# Patient Record
Sex: MALE | Race: WHITE | URBAN - METROPOLITAN AREA
[De-identification: names, ages, dates, MRNs, and addresses within clinical notes are randomized per-mention and may not be internally consistent; named-entity substitution may affect disease eponyms.]

---

## 2019-09-24 ENCOUNTER — OFFICE VISIT (OUTPATIENT)
Dept: FAMILY MEDICINE CLINIC | Age: 29
End: 2019-09-24
Payer: COMMERCIAL

## 2019-09-24 VITALS
HEIGHT: 72 IN | RESPIRATION RATE: 20 BRPM | WEIGHT: 224.4 LBS | BODY MASS INDEX: 30.4 KG/M2 | HEART RATE: 80 BPM | DIASTOLIC BLOOD PRESSURE: 80 MMHG | SYSTOLIC BLOOD PRESSURE: 112 MMHG | OXYGEN SATURATION: 97 %

## 2019-09-24 DIAGNOSIS — Z72.52 HIGH RISK HOMOSEXUAL BEHAVIOR: ICD-10-CM

## 2019-09-24 DIAGNOSIS — Z86.711 HISTORY OF PULMONARY EMBOLISM: ICD-10-CM

## 2019-09-24 DIAGNOSIS — Z87.442 HISTORY OF KIDNEY STONES: ICD-10-CM

## 2019-09-24 DIAGNOSIS — G90.A POTS (POSTURAL ORTHOSTATIC TACHYCARDIA SYNDROME): Primary | ICD-10-CM

## 2019-09-24 DIAGNOSIS — H69.83 CHRONIC DYSFUNCTION OF BOTH EUSTACHIAN TUBES: ICD-10-CM

## 2019-09-24 DIAGNOSIS — Z00.00 WELL ADULT EXAM: ICD-10-CM

## 2019-09-24 DIAGNOSIS — Z23 NEED FOR INFLUENZA VACCINATION: ICD-10-CM

## 2019-09-24 DIAGNOSIS — J32.9 RECURRENT SINUSITIS: ICD-10-CM

## 2019-09-24 LAB
ALBUMIN SERPL-MCNC: 4.4 G/DL
ALP BLD-CCNC: 65 U/L
ALT SERPL-CCNC: 52 U/L
AST SERPL-CCNC: 28 U/L
BASOPHILS ABSOLUTE: 0.1 /ΜL
BASOPHILS RELATIVE PERCENT: 1.2 %
BILIRUB SERPL-MCNC: 0.6 MG/DL (ref 0.1–1.4)
BUN BLDV-MCNC: 10 MG/DL
CALCIUM SERPL-MCNC: 8.9 MG/DL
CHLORIDE BLD-SCNC: 103 MMOL/L
CHOLESTEROL, FASTING: 182
CO2: 24 MMOL/L
CREAT SERPL-MCNC: 0.77 MG/DL
EOSINOPHILS ABSOLUTE: 0.2 /ΜL
EOSINOPHILS RELATIVE PERCENT: 4.2 %
GLUCOSE FASTING: 69 MG/DL
HCT VFR BLD CALC: 45.3 % (ref 41–53)
HDLC SERPL-MCNC: 48 MG/DL (ref 35–70)
HEMOGLOBIN: 16 G/DL (ref 13.5–17.5)
HIV AG/AB: NORMAL
LDL CHOLESTEROL CALCULATED: 109 MG/DL (ref 0–160)
LYMPHOCYTES ABSOLUTE: 1.7 /ΜL
LYMPHOCYTES RELATIVE PERCENT: 32.7 %
MCH RBC QN AUTO: 45.3 PG
MCHC RBC AUTO-ENTMCNC: 35.2 G/DL
MCV RBC AUTO: 87 FL
MONOCYTES ABSOLUTE: 0.6 /ΜL
MONOCYTES RELATIVE PERCENT: 11.8 %
NEUTROPHILS ABSOLUTE: 2.6 /ΜL
NEUTROPHILS RELATIVE PERCENT: 50.1 %
PDW BLD-RTO: NORMAL %
PLATELET # BLD: 249 K/ΜL
PMV BLD AUTO: 9.1 FL
POTASSIUM SERPL-SCNC: 4.1 MMOL/L
RBC # BLD: 5.22 10^6/ΜL
SODIUM BLD-SCNC: 139 MMOL/L
TOTAL PROTEIN: 7.4 G/DL (ref 6.4–8.2)
TRIGLYCERIDE, FASTING: 123
TSH SERPL DL<=0.05 MIU/L-ACNC: 1.37 UIU/ML
WBC # BLD: 5.3 10^3/ML

## 2019-09-24 PROCEDURE — 99204 OFFICE O/P NEW MOD 45 MIN: CPT | Performed by: NURSE PRACTITIONER

## 2019-09-24 PROCEDURE — 90686 IIV4 VACC NO PRSV 0.5 ML IM: CPT | Performed by: NURSE PRACTITIONER

## 2019-09-24 PROCEDURE — 90471 IMMUNIZATION ADMIN: CPT | Performed by: NURSE PRACTITIONER

## 2019-09-24 ASSESSMENT — ENCOUNTER SYMPTOMS
CONSTIPATION: 0
EYE ITCHING: 0
NAUSEA: 0
EYE DISCHARGE: 0
SORE THROAT: 0
ABDOMINAL PAIN: 0
COUGH: 0
SHORTNESS OF BREATH: 0
EYE REDNESS: 0
RHINORRHEA: 0
DIARRHEA: 0

## 2019-09-24 ASSESSMENT — PATIENT HEALTH QUESTIONNAIRE - PHQ9
SUM OF ALL RESPONSES TO PHQ QUESTIONS 1-9: 0
1. LITTLE INTEREST OR PLEASURE IN DOING THINGS: 0
2. FEELING DOWN, DEPRESSED OR HOPELESS: 0
SUM OF ALL RESPONSES TO PHQ QUESTIONS 1-9: 0
SUM OF ALL RESPONSES TO PHQ9 QUESTIONS 1 & 2: 0

## 2019-09-24 NOTE — PROGRESS NOTES
Medications   Medication Sig Dispense Refill    Multiple Vitamins-Minerals (MULTIVITAMIN ADULT PO) Take 1 tablet by mouth daily      loratadine (CLARITIN) 10 MG tablet Take 10 mg by mouth daily      vitamin D-3 (CHOLECALCIFEROL) 5000 UNITS TABS Take 5,000 Units by mouth daily       No current facility-administered medications for this visit. Objective:     /80 (Site: Left Upper Arm, Position: Sitting, Cuff Size: Large Adult)   Pulse 80   Resp 20   Ht 5' 11.5\" (1.816 m)   Wt 224 lb 6.4 oz (101.8 kg)   SpO2 97%   BMI 30.86 kg/m²      Physical Exam   Constitutional: He is oriented to person, place, and time. He appears well-developed and well-nourished. He is active. No distress. HENT:   Head: Normocephalic and atraumatic. Right Ear: Tympanic membrane and external ear normal.   Left Ear: Tympanic membrane and external ear normal.   Nose: Nose normal.   Mouth/Throat: Uvula is midline and oropharynx is clear and moist. No oropharyngeal exudate. Eyes: Pupils are equal, round, and reactive to light. Right eye exhibits no discharge. Left eye exhibits no discharge. Cardiovascular: Normal rate, regular rhythm and normal heart sounds. No murmur heard. Pulses:       Radial pulses are 2+ on the right side, and 2+ on the left side. Pulmonary/Chest: Effort normal and breath sounds normal. No respiratory distress. He has no decreased breath sounds. He has no wheezes. Abdominal: Soft. Bowel sounds are normal.   Musculoskeletal:   No visible red or swollen joints to bilateral upper and lower extremities. Neurological: He is alert and oriented to person, place, and time. He has normal strength. Skin: Skin is warm, dry and intact. No rash noted. Psychiatric: He has a normal mood and affect. His speech is normal and behavior is normal.   Vitals reviewed. Assessment:      Diagnosis Orders   1. POTS (postural orthostatic tachycardia syndrome)     2.  Need for influenza vaccination  INFLUENZA, QUADV, 3 YRS AND OLDER, IM PF, PREFILL SYR OR SDV, 0.5ML (AFLURIA QUADV, PF)   3. History of pulmonary embolism     4. History of kidney stones     5. High risk homosexual behavior  HIV Screen    T. pallidum Ab    Herpes Profile    Chlamydia/GC DNA, Urine   6. Well adult exam  CBC Auto Differential    Comprehensive Metabolic Panel, Fasting    Lipid, Fasting    TSH with Reflex   7. Chronic dysfunction of both eustachian tubes  Edgard Ng MD, Otolaryngology, [place]   8. Recurrent sinusitis  Edgard Ng MD, Otolaryngology, [facility]:     Return if symptoms worsen or fail to improve. Care established in office today. Blood work when fasting. Flu today. Chronic allergies. Will request records. Flu today. Encouraged healthy diet and exercise. Call office with any new or worsening symptoms or concerns. Linda Aldrich received counseling on the following healthy behaviors: nutrition, exercise and medication adherence  Reviewed prior labs and health maintenance  Continue current medications, diet and exercise. Discussed use, benefit, and side effects of prescribed medications. Barriers to medication compliance addressed. Patient given educational materials - see patient instructions  Was a self-tracking handout given in paper form or via [employer]? No:     Requested Prescriptions      No prescriptions requested or ordered in this encounter     All patient questions answered. Patient voiced understanding. Quality Measures    Body mass index is 30.86 kg/m². Elevated. Weight control planned discussed Healthy diet and regular exercise. BP: 112/80 Blood pressure is normal. Treatment plan consists of No treatment change needed.     No results found for: LDLCALC, LDLCHOLESTEROL, LDLDIRECT (goal LDL reduction with dx if diabetes is 50% LDL reduction)      PHQ Scores 9/24/2019   PHQ2 Score 0   PHQ9 Score 0     Interpretation of Total Score Depression Severity: 1-4 = Minimal depression, 5-9

## 2019-09-26 DIAGNOSIS — Z00.00 WELL ADULT EXAM: ICD-10-CM

## 2019-09-26 DIAGNOSIS — Z72.52 HIGH RISK HOMOSEXUAL BEHAVIOR: ICD-10-CM

## 2019-09-27 DIAGNOSIS — Z72.52 HIGH RISK HOMOSEXUAL BEHAVIOR: ICD-10-CM

## 2019-10-03 ENCOUNTER — TELEPHONE (OUTPATIENT)
Dept: FAMILY MEDICINE CLINIC | Age: 29
End: 2019-10-03

## 2019-10-05 ENCOUNTER — NURSE ONLY (OUTPATIENT)
Dept: FAMILY MEDICINE CLINIC | Age: 29
End: 2019-10-05
Payer: COMMERCIAL

## 2019-10-05 DIAGNOSIS — Z23 NEED FOR IMMUNIZATION AGAINST TYPHOID: ICD-10-CM

## 2019-10-05 DIAGNOSIS — Z23 NEED FOR HEPATITIS A IMMUNIZATION: Primary | ICD-10-CM

## 2019-10-05 DIAGNOSIS — Z71.84 TRAVEL ADVICE ENCOUNTER: Primary | ICD-10-CM

## 2019-10-05 PROCEDURE — 90471 IMMUNIZATION ADMIN: CPT | Performed by: NURSE PRACTITIONER

## 2019-10-05 PROCEDURE — 90632 HEPA VACCINE ADULT IM: CPT | Performed by: NURSE PRACTITIONER

## 2019-10-23 DIAGNOSIS — J32.9 RECURRENT SINUS INFECTIONS: Primary | ICD-10-CM

## 2019-11-07 ENCOUNTER — TELEPHONE (OUTPATIENT)
Dept: FAMILY MEDICINE CLINIC | Age: 29
End: 2019-11-07

## 2019-11-07 DIAGNOSIS — J30.89 NON-SEASONAL ALLERGIC RHINITIS DUE TO OTHER ALLERGIC TRIGGER: ICD-10-CM

## 2019-11-07 DIAGNOSIS — J32.9 RECURRENT SINUS INFECTIONS: Primary | ICD-10-CM

## 2019-11-07 RX ORDER — DESLORATADINE 5 MG/1
5 TABLET ORAL DAILY
Qty: 30 TABLET | Refills: 5 | Status: SHIPPED | OUTPATIENT
Start: 2019-11-07 | End: 2020-05-05

## 2019-11-07 RX ORDER — PREDNISONE 20 MG/1
TABLET ORAL
Qty: 30 TABLET | Refills: 0 | Status: SHIPPED | OUTPATIENT
Start: 2019-11-07 | End: 2019-11-17

## 2020-01-10 ENCOUNTER — OFFICE VISIT (OUTPATIENT)
Dept: FAMILY MEDICINE CLINIC | Age: 30
End: 2020-01-10
Payer: COMMERCIAL

## 2020-01-10 VITALS
HEART RATE: 76 BPM | DIASTOLIC BLOOD PRESSURE: 98 MMHG | WEIGHT: 226 LBS | SYSTOLIC BLOOD PRESSURE: 126 MMHG | BODY MASS INDEX: 31.08 KG/M2 | TEMPERATURE: 98.7 F

## 2020-01-10 PROBLEM — J32.9 RECURRENT SINUSITIS: Chronic | Status: ACTIVE | Noted: 2020-01-10

## 2020-01-10 PROBLEM — J34.89 CONCHA BULLOSA: Status: ACTIVE | Noted: 2020-01-10

## 2020-01-10 PROBLEM — J34.2 DEVIATED SEPTUM: Status: ACTIVE | Noted: 2020-01-10

## 2020-01-10 PROCEDURE — 99213 OFFICE O/P EST LOW 20 MIN: CPT | Performed by: NURSE PRACTITIONER

## 2020-01-10 RX ORDER — FEXOFENADINE HYDROCHLORIDE AND PSEUDOEPHEDRINE HYDROCHLORIDE 180; 240 MG/1; MG/1
1 TABLET, FILM COATED, EXTENDED RELEASE ORAL DAILY
COMMUNITY
Start: 2019-12-10 | End: 2021-01-10

## 2020-01-10 RX ORDER — MECLIZINE HYDROCHLORIDE 25 MG/1
12.5-25 TABLET ORAL 3 TIMES DAILY PRN
Qty: 60 TABLET | Refills: 0 | Status: SHIPPED | OUTPATIENT
Start: 2020-01-10 | End: 2020-01-30

## 2020-01-10 RX ORDER — AMITRIPTYLINE HYDROCHLORIDE 25 MG/1
25 TABLET, FILM COATED ORAL NIGHTLY
Qty: 30 TABLET | Refills: 5 | Status: SHIPPED | OUTPATIENT
Start: 2020-01-10 | End: 2021-02-09

## 2020-01-10 ASSESSMENT — ENCOUNTER SYMPTOMS
EYE REDNESS: 0
DIARRHEA: 0
ABDOMINAL PAIN: 0
SORE THROAT: 0
RHINORRHEA: 0
EYE DISCHARGE: 0
EYE ITCHING: 0
SHORTNESS OF BREATH: 0
COUGH: 0
CONSTIPATION: 0
NAUSEA: 0

## 2020-01-10 ASSESSMENT — PATIENT HEALTH QUESTIONNAIRE - PHQ9
SUM OF ALL RESPONSES TO PHQ9 QUESTIONS 1 & 2: 0
2. FEELING DOWN, DEPRESSED OR HOPELESS: 0
1. LITTLE INTEREST OR PLEASURE IN DOING THINGS: 0
SUM OF ALL RESPONSES TO PHQ QUESTIONS 1-9: 0
SUM OF ALL RESPONSES TO PHQ QUESTIONS 1-9: 0

## 2020-01-10 NOTE — PROGRESS NOTES
syndrome)    History of pulmonary embolism    History of kidney stones    Chronic dysfunction of both eustachian tubes    Samantha bullosa    Deviated septum    Recurrent sinusitis     HPI    Patient Care Team:  CHAZ Springer CNP as PCP - General (Nurse Practitioner)  CHAZ Springer CNP as PCP - [facility] Empaneled Provider    Visit Information    Have you changed or started any medications since your last visit including any over-the-counter medicines, vitamins, or herbal medicines? yes -    Are you having any side effects from any of your medications? -  no  Have you stopped taking any of your medications? Is so, why? -  no  Have you seen any other physician or provider since your last visit? Yes - Records Obtained  Have you had any other diagnostic tests since your last visit? Yes - Records Obtained  Have you been seen in the emergency room and/or had an admission to a hospital since we last saw you? No  Have you had your routine dental cleaning in the past 6 months? Yes  Have you activated your [employer] account? Yes  If activated, Do you have the mobile des and comfortable using functions? Yes    Reviewed     [x] Past Medical, Family, and Social History was reviewed per writer and does contribute to the patient presenting condition.     [x] Laboratory Results, Vital signs, Imaging, Active Problems, Immunizations, Current/Recently Discontinued Medications, Health Maintenance Activities Due, Referral Notes (if available) were reviewed per writer     [x] Reviewed Depression screening if taken or valid today or any other valid screening tool (others seen below) Interpretation of Total Score DepressionSeverity: 1-4 = Minimal depression, 5-9 = Mild depression, 10-14 = Moderate depression, 15-19 = Moderately severe depression, 20-27 =Severe depression    PHQ Scores 1/10/2020 9/24/2019   PHQ2 Score 0 0   PHQ9 Score 0 0     Interpretation of Total Score Depression Severity: 1-4 = Minimal depression, 5-9 = Mild depression, 10-14 = Moderate depression, 15-19 = Moderately severe depression, 20-27 = Severe depression     Review of Systems (Subjective)     Review of Systems   Constitutional: Negative for activity change, appetite change, fatigue and fever. HENT: Positive for congestion (chronic, improving since sinus surgery). Negative for ear pain, postnasal drip, rhinorrhea and sore throat. Eyes: Positive for visual disturbance. Negative for discharge, redness and itching. Respiratory: Negative for cough and shortness of breath. Cardiovascular: Negative for chest pain. Gastrointestinal: Negative for abdominal pain, constipation, diarrhea and nausea. Genitourinary: Negative for dysuria. Musculoskeletal: Positive for arthralgias and myalgias. Neck and pain   Skin: Negative for rash. Neurological: Positive for dizziness and light-headedness. Negative for headaches. Issues improving since concussion   Psychiatric/Behavioral: Negative for dysphoric mood and sleep disturbance. The patient is not nervous/anxious. Physical Assessment (Objective)     BP (!) 126/98 (Site: Left Upper Arm, Position: Sitting, Cuff Size: Medium Adult)   Pulse 76   Temp 98.7 °F (37.1 °C) (Tympanic)   Wt 226 lb (102.5 kg)   BMI 31.08 kg/m²      Physical Exam  Vitals signs reviewed. Constitutional:       General: He is not in acute distress. Appearance: He is well-developed. HENT:      Head: Normocephalic and atraumatic. Right Ear: Tympanic membrane and external ear normal.      Left Ear: Tympanic membrane and external ear normal.      Nose: Nose normal.      Mouth/Throat:      Pharynx: Uvula midline. No oropharyngeal exudate. Eyes:      General:         Right eye: No discharge. Left eye: No discharge. Pupils: Pupils are equal, round, and reactive to light. Cardiovascular:      Rate and Rhythm: Normal rate and regular rhythm.       Pulses:           Radial pulses are 2+ on the right side and 2+ on the left side. Heart sounds: Normal heart sounds. No murmur. Pulmonary:      Effort: Pulmonary effort is normal. No respiratory distress. Breath sounds: Normal breath sounds. No decreased breath sounds or wheezing. Abdominal:      General: Bowel sounds are normal.      Palpations: Abdomen is soft. Musculoskeletal:      Comments: No visible red or swollen joints to bilateral upper and lower extremities. Skin:     General: Skin is warm and dry. Findings: No rash. Neurological:      Mental Status: He is alert and oriented to person, place, and time. Psychiatric:         Speech: Speech normal.         Behavior: Behavior normal.       Diagnoses:      Diagnosis Orders   1. Post concussion syndrome  amitriptyline (ELAVIL) 25 MG tablet   2. Samantha bullosa     3. Recurrent sinusitis     4. Deviated septum     5. Chronic dysfunction of both eustachian tubes       Plan:     Items for Patient/Writer/Staff to Accomplish   Will review records once received. , Reviewed Recent Records in System from urgent care/EMERGENCY ROOM/Admission/Specialist, Update writer in 1 week either per call or Mychart (preferred). , Medications sent and will be available at pharmacy or mail away, Continue with all other medications as prescribed. , Continue regular follow up's with specialist including but not limited to: Allergy, Mychart already complete or Patient to complete their Mychart Sign up and instructed on easiest way to contact writer. and Patient to bring in medications next time in office    Elavil to potentially improve symptoms   Meclizine for dizziness. Discussed concussion protocol. Verbalized understanding. Encouraged to avoid screen time. Turn down brightness if possible. Regular sleep cycle for next week. Sunglasses in school with florescent lights. Okay to drive. Note provided for school and sports. Report any red flags. No imaging necessary at this time. Reevaluate in 1 week. Encouraged healthy diet and exercise. Call office with any new or worsening symptoms or concerns. Encouraged healthy diet and exercise. Call office with any new or worsening symptoms or concerns. Jw received counseling on the following healthy behaviors: nutrition, exercise and medication adherence  Reviewed prior labs and health maintenance. Continue current medications, diet and exercise. Discussed use, benefit, and side effects of prescribed medications. Barriers to medication compliance addressed. Patient given educational materials - see patient instructions. All patient questions answered. Patient voiced understanding. Return if symptoms worsen or fail to improve. \"There is beauty in all things if you choose to see it\"    Marciano Woods, MSN, APRN-CNP   [facility]  [email or web address]   Office: [phone]   Cell: [phone]    Electronically signed by CHAZ Wadsworth CNP on 1/10/2020 at 3:15 PM

## 2020-02-26 LAB
BILIRUBIN URINE: 0 MG/DL
BLOOD, URINE: POSITIVE
CLARITY: CLEAR
COLOR: YELLOW
GLUCOSE URINE: NEGATIVE
KETONES, URINE: NEGATIVE
LEUKOCYTE ESTERASE, URINE: NEGATIVE
NITRITE, URINE: NEGATIVE
PH UA: 6 (ref 4.5–8)
PROTEIN UA: POSITIVE
SPECIFIC GRAVITY UA: 1.02 (ref 1–1.03)
UROBILINOGEN, URINE: ABNORMAL

## 2020-02-27 DIAGNOSIS — R30.0 DYSURIA: ICD-10-CM

## 2020-02-27 RX ORDER — TAMSULOSIN HYDROCHLORIDE 0.4 MG/1
0.4 CAPSULE ORAL DAILY
Qty: 30 CAPSULE | Refills: 1 | Status: SHIPPED | OUTPATIENT
Start: 2020-02-27 | End: 2021-02-09

## 2020-02-27 RX ORDER — NITROFURANTOIN 25; 75 MG/1; MG/1
100 CAPSULE ORAL 2 TIMES DAILY
Qty: 20 CAPSULE | Refills: 0 | Status: SHIPPED | OUTPATIENT
Start: 2020-02-27 | End: 2020-03-08

## 2020-02-27 RX ORDER — PHENAZOPYRIDINE HYDROCHLORIDE 200 MG/1
200 TABLET, FILM COATED ORAL 3 TIMES DAILY PRN
Qty: 9 TABLET | Refills: 0 | Status: SHIPPED | OUTPATIENT
Start: 2020-02-27 | End: 2020-03-01

## 2020-03-08 ENCOUNTER — TELEPHONE (OUTPATIENT)
Dept: FAMILY MEDICINE CLINIC | Age: 30
End: 2020-03-08

## 2020-03-08 RX ORDER — CIPROFLOXACIN 500 MG/1
500 TABLET, FILM COATED ORAL 2 TIMES DAILY
Qty: 20 TABLET | Refills: 0 | Status: SHIPPED | OUTPATIENT
Start: 2020-03-08 | End: 2020-03-18

## 2020-07-17 ENCOUNTER — TELEPHONE (OUTPATIENT)
Dept: FAMILY MEDICINE CLINIC | Age: 30
End: 2020-07-17

## 2020-07-17 NOTE — TELEPHONE ENCOUNTER
Patient followed with SR. Please call patient to schedule follow-up appointment with any of the new providers to establish care moving forward.

## 2021-02-09 ENCOUNTER — OFFICE VISIT (OUTPATIENT)
Dept: FAMILY MEDICINE CLINIC | Age: 31
End: 2021-02-09
Payer: COMMERCIAL

## 2021-02-09 VITALS
BODY MASS INDEX: 30.91 KG/M2 | TEMPERATURE: 98.4 F | OXYGEN SATURATION: 97 % | WEIGHT: 228.2 LBS | SYSTOLIC BLOOD PRESSURE: 126 MMHG | DIASTOLIC BLOOD PRESSURE: 84 MMHG | HEART RATE: 91 BPM | HEIGHT: 72 IN

## 2021-02-09 DIAGNOSIS — Z76.89 ENCOUNTER TO ESTABLISH CARE: Primary | ICD-10-CM

## 2021-02-09 DIAGNOSIS — Z23 IMMUNIZATION DUE: ICD-10-CM

## 2021-02-09 DIAGNOSIS — Z00.00 ROUTINE PHYSICAL EXAMINATION: ICD-10-CM

## 2021-02-09 PROBLEM — J34.89 CONCHA BULLOSA: Status: RESOLVED | Noted: 2020-01-10 | Resolved: 2021-02-09

## 2021-02-09 PROCEDURE — 90686 IIV4 VACC NO PRSV 0.5 ML IM: CPT | Performed by: NURSE PRACTITIONER

## 2021-02-09 PROCEDURE — 90471 IMMUNIZATION ADMIN: CPT | Performed by: NURSE PRACTITIONER

## 2021-02-09 PROCEDURE — 99385 PREV VISIT NEW AGE 18-39: CPT | Performed by: NURSE PRACTITIONER

## 2021-02-09 RX ORDER — FLUTICASONE PROPIONATE 50 MCG
1 SPRAY, SUSPENSION (ML) NASAL DAILY
COMMUNITY

## 2021-02-09 RX ORDER — MONTELUKAST SODIUM 10 MG/1
10 TABLET ORAL NIGHTLY
COMMUNITY

## 2021-02-09 SDOH — ECONOMIC STABILITY: FOOD INSECURITY: WITHIN THE PAST 12 MONTHS, THE FOOD YOU BOUGHT JUST DIDN'T LAST AND YOU DIDN'T HAVE MONEY TO GET MORE.: NEVER TRUE

## 2021-02-09 ASSESSMENT — PATIENT HEALTH QUESTIONNAIRE - PHQ9
SUM OF ALL RESPONSES TO PHQ9 QUESTIONS 1 & 2: 0
SUM OF ALL RESPONSES TO PHQ QUESTIONS 1-9: 0
SUM OF ALL RESPONSES TO PHQ QUESTIONS 1-9: 0

## 2021-02-09 ASSESSMENT — ENCOUNTER SYMPTOMS
DIARRHEA: 0
WHEEZING: 0
NAUSEA: 0
EYES NEGATIVE: 1
GASTROINTESTINAL NEGATIVE: 1
COUGH: 0
ANAL BLEEDING: 0
RESPIRATORY NEGATIVE: 1
VOMITING: 0
COLOR CHANGE: 0
CHEST TIGHTNESS: 0
BLOOD IN STOOL: 0
SHORTNESS OF BREATH: 0

## 2021-02-09 NOTE — PROGRESS NOTES
[facility]  [address] [place] [address], Leonid Dailey  Dept: [phone]    Flores Oh is a 31 y.o. male who presents today for his medical conditions/complaintsas noted below. Flores Oh is here today c/o Establish Care    Past Medical History:   Diagnosis Date    Chronic sinusitis     Concussion     s/p falling off ladder    Kidney stone     Lemiere syndrome     POTS (postural orthostatic tachycardia syndrome)     Pulmonary emboli (HCC)     States \"17\" PE's      Past Surgical History:   Procedure Laterality Date    LITHOTRIPSY     Guero Vega       Family History   Problem Relation Age of Onset    Heart Disease Maternal Grandfather         triple bypass but a smoker    Other Sister         P.O. T.S    Asthma Brother     Asthma Sister        Social History     Tobacco Use    Smoking status: Never Smoker    Smokeless tobacco: Never Used   Substance Use Topics    Alcohol use: Yes     Comment: Social      Current Outpatient Medications   Medication Sig Dispense Refill    fluticasone (FLONASE) 50 MCG/ACT nasal spray 1 spray by Each Nostril route daily      Loratadine (CLARITIN PO) Take by mouth daily      montelukast (SINGULAIR) 10 MG tablet Take 10 mg by mouth nightly      MAGNESIUM PO Take by mouth      CALCIUM PO Take by mouth daily      Green Tea, Sabine sinensis, (GREEN TEA EXTRACT PO) Take by mouth daily      Multiple Vitamins-Minerals (MULTIVITAMIN ADULT PO) Take 1 tablet by mouth daily      vitamin D-3 (CHOLECALCIFEROL) 5000 UNITS TABS Take 5,000 Units by mouth daily       No current facility-administered medications for this visit.       Allergies   Allergen Reactions    Latex     Bupropion      Other reaction(s): Headache    Sulfa Antibiotics          HPI:     HPI    Presents today c/o new patient     Specialists ENT - Dr. 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 Negative for activity change, appetite change, chills, diaphoresis, fever and unexpected weight change. HENT: Negative. Eyes: Negative. Respiratory: Negative. Negative for cough, chest tightness, shortness of breath and wheezing. Cardiovascular: Negative. Negative for chest pain, palpitations and leg swelling. Gastrointestinal: Negative. Negative for anal bleeding, blood in stool, diarrhea, nausea and vomiting. Endocrine: Negative. Genitourinary: Negative. Negative for difficulty urinating and hematuria. Musculoskeletal: Negative. Skin: Negative. Negative for color change, pallor, rash and wound. Allergic/Immunologic: Positive for environmental allergies. Neurological: Negative. Negative for seizures, syncope, facial asymmetry, speech difficulty, weakness and numbness. Hematological: Negative. Psychiatric/Behavioral: Negative. Negative for dysphoric mood and sleep disturbance. The patient is not nervous/anxious. Objective:     Vitals:    02/09/21 1404   BP: 126/84   Pulse: 91   Temp: 98.4 °F (36.9 °C)   SpO2: 97%       Body mass index is 30.95 kg/m². Physical Exam  Constitutional:       General: He is not in acute distress. Appearance: Normal appearance. He is well-developed and normal weight. He is not ill-appearing, toxic-appearing or diaphoretic.    HENT: Head: Normocephalic and atraumatic. Right Ear: External ear normal. Tympanic membrane is scarred. Left Ear: External ear normal.      Nose: Nose normal.   Eyes:      General: No scleral icterus. Right eye: No discharge. Left eye: No discharge. Conjunctiva/sclera: Conjunctivae normal.      Pupils: Pupils are equal, round, and reactive to light. Neck:      Musculoskeletal: Normal range of motion and neck supple. Trachea: No tracheal deviation. Cardiovascular:      Rate and Rhythm: Normal rate and regular rhythm. Heart sounds: Normal heart sounds. No murmur. No friction rub. No gallop. Pulmonary:      Effort: Pulmonary effort is normal. No tachypnea, accessory muscle usage or respiratory distress. Breath sounds: Normal breath sounds. No stridor. No decreased breath sounds, wheezing, rhonchi or rales. Abdominal:      General: Bowel sounds are normal. There is no distension. Palpations: Abdomen is soft. Tenderness: There is no abdominal tenderness. There is no guarding. Musculoskeletal: Normal range of motion. General: No tenderness or deformity. Right lower leg: No edema. Left lower leg: No edema. Skin:     General: Skin is warm and dry. Coloration: Skin is not pale. Findings: No erythema or rash. Neurological:      Mental Status: He is alert and oriented to person, place, and time. GCS: GCS eye subscore is 4. GCS verbal subscore is 5. GCS motor subscore is 6. Gait: Gait normal.   Psychiatric:         Speech: Speech normal.         Behavior: Behavior normal.         Thought Content: Thought content normal.         Judgment: Judgment normal.           Assessment:         1. Encounter to establish care    2. Routine physical examination    3. Immunization due        Plan:     1. Encounter to establish care      2.  Routine physical examination    Labs up to date and reviewed  Immunizations UTD

## 2021-06-29 ENCOUNTER — OFFICE VISIT (OUTPATIENT)
Dept: FAMILY MEDICINE CLINIC | Age: 31
End: 2021-06-29
Payer: COMMERCIAL

## 2021-06-29 VITALS
BODY MASS INDEX: 30.35 KG/M2 | WEIGHT: 223.8 LBS | HEART RATE: 78 BPM | SYSTOLIC BLOOD PRESSURE: 122 MMHG | TEMPERATURE: 97.6 F | OXYGEN SATURATION: 98 % | DIASTOLIC BLOOD PRESSURE: 100 MMHG

## 2021-06-29 DIAGNOSIS — N50.811 TESTICULAR PAIN, RIGHT: Primary | ICD-10-CM

## 2021-06-29 DIAGNOSIS — R03.0 ELEVATED BLOOD PRESSURE READING: ICD-10-CM

## 2021-06-29 PROCEDURE — 99214 OFFICE O/P EST MOD 30 MIN: CPT | Performed by: NURSE PRACTITIONER

## 2021-06-29 ASSESSMENT — ENCOUNTER SYMPTOMS
NAUSEA: 0
DIARRHEA: 0
VOMITING: 0
RESPIRATORY NEGATIVE: 1
COLOR CHANGE: 0
BACK PAIN: 1
GASTROINTESTINAL NEGATIVE: 1
ALLERGIC/IMMUNOLOGIC NEGATIVE: 1
EYES NEGATIVE: 1
ABDOMINAL PAIN: 0

## 2021-06-29 NOTE — PATIENT INSTRUCTIONS
diet  · Eat 4 to 5 servings of fruit each day. A serving is 1 medium-sized piece of fruit, ½ cup chopped or canned fruit, 1/4 cup dried fruit, or 4 ounces (½ cup) of fruit juice. Choose fruit more often than fruit juice. · Eat 4 to 5 servings of vegetables each day. A serving is 1 cup of lettuce or raw leafy vegetables, ½ cup of chopped or cooked vegetables, or 4 ounces (½ cup) of vegetable juice. Choose vegetables more often than vegetable juice. · Get 2 to 3 servings of low-fat and fat-free dairy each day. A serving is 8 ounces of milk, 1 cup of yogurt, or 1 ½ ounces of cheese. · Eat 6 to 8 servings of grains each day. A serving is 1 slice of bread, 1 ounce of dry cereal, or ½ cup of cooked rice, pasta, or cooked cereal. Try to choose whole-grain products as much as possible. · Limit lean meat, poultry, and fish to 2 servings each day. A serving is 3 ounces, about the size of a deck of cards. · Eat 4 to 5 servings of nuts, seeds, and legumes (cooked dried beans, lentils, and split peas) each week. A serving is 1/3 cup of nuts, 2 tablespoons of seeds, or ½ cup of cooked beans or peas. · Limit fats and oils to 2 to 3 servings each day. A serving is 1 teaspoon of vegetable oil or 2 tablespoons of salad dressing. · Limit sweets and added sugars to 5 servings or less a week. A serving is 1 tablespoon jelly or jam, ½ cup sorbet, or 1 cup of lemonade. · Eat less than 2,300 milligrams (mg) of sodium a day. If you limit your sodium to 1,500 mg a day, you can lower your blood pressure even more. · Be aware that all of these are the suggested number of servings for people who eat 1,800 to 2,000 calories a day. Your recommended number of servings may be different if you need more or fewer calories. Tips for success  · Start small. Do not try to make dramatic changes to your diet all at once. You might feel that you are missing out on your favorite foods and then be more likely to not follow the plan.  Make small changes, and stick with them. Once those changes become habit, add a few more changes. · Try some of the following:  ? Make it a goal to eat a fruit or vegetable at every meal and at snacks. This will make it easy to get the recommended amount of fruits and vegetables each day. ? Try yogurt topped with fruit and nuts for a snack or healthy dessert. ? Add lettuce, tomato, cucumber, and onion to sandwiches. ? Combine a ready-made pizza crust with low-fat mozzarella cheese and lots of vegetable toppings. Try using tomatoes, squash, spinach, broccoli, carrots, cauliflower, and onions. ? Have a variety of cut-up vegetables with a low-fat dip as an appetizer instead of chips and dip. ? Sprinkle sunflower seeds or chopped almonds over salads. Or try adding chopped walnuts or almonds to cooked vegetables. ? Try some vegetarian meals using beans and peas. Add garbanzo or kidney beans to salads. Make burritos and tacos with mashed mensah beans or black beans. Where can you learn more? Go to https://[employer] MaterialspePact.LoggedIn. org and sign in to your [employer] account. Enter R773 in the [facility] box to learn more about \"DASH Diet: Care Instructions. \"     If you do not have an account, please click on the \"Sign Up Now\" link. Current as of: August 31, 2020               Content Version: 12.9  © 2006-2021 [employer]. Care instructions adapted under license by [facility] ([facility]). If you have questions about a medical condition or this instruction, always ask your healthcare professional. [place] [address] any warranty or liability for your use of this information. Patient Education        High Blood Pressure: Care Instructions  Overview     It's normal for blood pressure to go up and down throughout the day. But if it stays up, you have high blood pressure. Another name for high blood pressure is hypertension.   Despite what a lot of people think, high blood pressure usually doesn't cause headaches or make you feel dizzy or lightheaded. It usually has no symptoms. But it does increase your risk of stroke, heart attack, and other problems. You and your doctor will talk about your risks of these problems based on your blood pressure. Your doctor will give you a goal for your blood pressure. Your goal will be based on your health and your age. Lifestyle changes, such as eating healthy and being active, are always important to help lower blood pressure. You might also take medicine to reach your blood pressure goal.  Follow-up care is a key part of your treatment and safety. Be sure to make and go to all appointments, and call your doctor if you are having problems. It's also a good idea to know your test results and keep a list of the medicines you take. How can you care for yourself at home? Medical treatment  · If you stop taking your medicine, your blood pressure will go back up. You may take one or more types of medicine to lower your blood pressure. Be safe with medicines. Take your medicine exactly as prescribed. Call your doctor if you think you are having a problem with your medicine. · Talk to your doctor before you start taking aspirin every day. Aspirin can help certain people lower their risk of a heart attack or stroke. But taking aspirin isn't right for everyone, because it can cause serious bleeding. · See your doctor regularly. You may need to see the doctor more often at first or until your blood pressure comes down. · If you are taking blood pressure medicine, talk to your doctor before you take decongestants or anti-inflammatory medicine, such as ibuprofen. Some of these medicines can raise blood pressure. · Learn how to check your blood pressure at home. Lifestyle changes  · Stay at a healthy weight. This is especially important if you put on weight around the waist. Losing even 10 pounds can help you lower your blood pressure.   · If your doctor recommends it, get more exercise. Walking is a good choice. Bit by bit, increase the amount you walk every day. Try for at least 30 minutes on most days of the week. You also may want to swim, bike, or do other activities. · Avoid or limit alcohol. Talk to your doctor about whether you can drink any alcohol. · Try to limit how much sodium you eat to less than 2,300 milligrams (mg) a day. Your doctor may ask you to try to eat less than 1,500 mg a day. · Eat plenty of fruits (such as bananas and oranges), vegetables, legumes, whole grains, and low-fat dairy products. · Lower the amount of saturated fat in your diet. Saturated fat is found in animal products such as milk, cheese, and meat. Limiting these foods may help you lose weight and also lower your risk for heart disease. · Do not smoke. Smoking increases your risk for heart attack and stroke. If you need help quitting, talk to your doctor about stop-smoking programs and medicines. These can increase your chances of quitting for good. When should you call for help? Call 911  anytime you think you may need emergency care. This may mean having symptoms that suggest that your blood pressure is causing a serious heart or blood vessel problem. Your blood pressure may be over 180/120. For example, call 911 if:    · You have symptoms of a heart attack. These may include:  ? Chest pain or pressure, or a strange feeling in the chest.  ? Sweating. ? Shortness of breath. ? Nausea or vomiting. ? Pain, pressure, or a strange feeling in the back, neck, jaw, or upper belly or in one or both shoulders or arms. ? Lightheadedness or sudden weakness. ? A fast or irregular heartbeat.     · You have symptoms of a stroke. These may include:  ? Sudden numbness, tingling, weakness, or loss of movement in your face, arm, or leg, especially on only one side of your body. ? Sudden vision changes. ? Sudden trouble speaking.   ? Sudden confusion or trouble understanding simple statements. ? Sudden problems with walking or balance. ? A sudden, severe headache that is different from past headaches.     · You have severe back or belly pain. Do not wait until your blood pressure comes down on its own. Get help right away. Call your doctor now or seek immediate care if:    · Your blood pressure is much higher than normal (such as 180/120 or higher), but you don't have symptoms.     · You think high blood pressure is causing symptoms, such as:  ? Severe headache.  ? Blurry vision. Watch closely for changes in your health, and be sure to contact your doctor if:    · Your blood pressure measures higher than your doctor recommends at least 2 times. That means the top number is higher or the bottom number is higher, or both.     · You think you may be having side effects from your blood pressure medicine. Where can you learn more? Go to https://Vilynxclintoneb.Cass Art. org and sign in to your [employer] account. Enter K264 in the [facility] box to learn more about \"High Blood Pressure: Care Instructions. \"     If you do not have an account, please click on the \"Sign Up Now\" link. Current as of: August 31, 2020               Content Version: 12.9  © 5669-1944 [employer]. Care instructions adapted under license by [facility] ([facility]). If you have questions about a medical condition or this instruction, always ask your healthcare professional. [place] [address] any warranty or liability for your use of this information. Patient Education        Learning About High Blood Pressure  What is high blood pressure? Blood pressure is a measure of how hard the blood pushes against the walls of your arteries. It's normal for blood pressure to go up and down throughout the day. But if it stays up, you have high blood pressure. Another name for high blood pressure is hypertension. Two numbers tell you your blood pressure.  The first number is the systolic pressure (top number). It shows how hard the blood pushes when your heart is pumping. The second number is the diastolic pressure (bottom number). It shows how hard the blood pushes between heartbeats, when your heart is relaxed and filling with blood. Your doctor will give you a goal for your blood pressure based on your health and your age. High blood pressure (hypertension) means that the top number stays high, or the bottom number stays high, or both. High blood pressure increases the risk of stroke, heart attack, and other problems. What happens when you have high blood pressure? · Blood flows through your arteries with too much force. Over time, this can damage the heart and the walls of your arteries. But you can't feel it. High blood pressure usually doesn't cause symptoms. · High blood pressure makes your heart work harder. And that can lead to heart failure, which means your heart doesn't pump as much blood as your body needs. · Fat and calcium start to build up in your arteries. This buildup is called hardening of the arteries. It can cause many problems including a heart attack and stroke. · Arteries also carry blood and oxygen to organs like your eyes, kidneys, and brain. If high blood pressure damages those arteries, it can lead to vision loss, kidney disease, stroke, and a higher risk of dementia. How can you prevent high blood pressure? · Stay at a healthy weight. · Try to limit how much sodium you eat to less than 2,300 milligrams (mg) a day. If you limit your sodium to 1,500 mg a day, you can lower your blood pressure even more. ? Buy foods that are labeled \"unsalted,\" \"sodium-free,\" or \"low-sodium. \" Foods labeled \"reduced-sodium\" and \"light sodium\" may still have too much sodium. ? Flavor your food with garlic, lemon juice, onion, vinegar, herbs, and spices instead of salt. Do not use soy sauce, steak sauce, onion salt, garlic salt, mustard, or ketchup on your food. ?  Use less salt (or none) when recipes call for it. You can often use half the salt a recipe calls for without losing flavor. · Be physically active. Get at least 30 minutes of exercise on most days of the week. Walking is a good choice. You also may want to do other activities, such as running, swimming, cycling, or playing tennis or team sports. · Limit alcohol to 2 drinks a day for men and 1 drink a day for women. · Eat plenty of fruits, vegetables, and low-fat dairy products. Eat less saturated and total fats. How is high blood pressure treated? · Your doctor will suggest making lifestyle changes to help your heart. For example, your doctor may ask you to eat healthy foods, quit smoking, lose extra weight, and be more active. · If lifestyle changes don't help enough, your doctor may recommend that you take medicine. · When blood pressure is very high, medicines are needed to lower it. Follow-up care is a key part of your treatment and safety. Be sure to make and go to all appointments, and call your doctor if you are having problems. It's also a good idea to know your test results and keep a list of the medicines you take. Where can you learn more? Go to https://Orthogem.VenueAgent. org and sign in to your [employer] account. Enter P501 in the [facility] box to learn more about \"Learning About High Blood Pressure. \"     If you do not have an account, please click on the \"Sign Up Now\" link. Current as of: August 31, 2020               Content Version: 12.9  © 6670-1704 [employer]. Care instructions adapted under license by [facility] ([facility]). If you have questions about a medical condition or this instruction, always ask your healthcare professional. [place] [address] any warranty or liability for your use of this information.

## 2021-06-29 NOTE — PROGRESS NOTES
[facility]  [address]  Dept: [phone]    Juan Sherwood is a 27 y.o. male who presents today for his medical conditions/complaintsas noted below. Juan Sherwood is here today c/o Mass (testicle right  for the past few weeks)    Past Medical History:   Diagnosis Date    Chronic sinusitis     Concussion     s/p falling off ladder    Kidney stone     Lemiere syndrome     POTS (postural orthostatic tachycardia syndrome)     Pulmonary emboli (HCC)     States \"17\" PE's      Past Surgical History:   Procedure Laterality Date    LITHOTRIPSY     Nathaniel Abarca       Family History   Problem Relation Age of Onset    Heart Disease Maternal Grandfather         triple bypass but a smoker    Other Sister         P.O. T.S    Asthma Brother     Asthma Sister        Social History     Tobacco Use    Smoking status: Never Smoker    Smokeless tobacco: Never Used   Substance Use Topics    Alcohol use: Yes     Comment: Social      Current Outpatient Medications   Medication Sig Dispense Refill    fluticasone (FLONASE) 50 MCG/ACT nasal spray 1 spray by Each Nostril route daily      montelukast (SINGULAIR) 10 MG tablet Take 10 mg by mouth nightly      vitamin D-3 (CHOLECALCIFEROL) 5000 UNITS TABS Take 5,000 Units by mouth daily      Loratadine (CLARITIN PO) Take by mouth daily       No current facility-administered medications for this visit.      Allergies   Allergen Reactions    Latex     Bupropion      Other reaction(s): Headache    Sulfa Antibiotics          HPI:     HPI    Presents today c/o R sided testicular mass   Symptoms ongoing x 10 days   Symptoms are gradually improving   Started like a pimple exterior that felt ' swollen ' on the inside  Pimple seemed to have resolve   Pain was very mild in nature  Declines related penile discharge, urinary symptoms, fever/chills or pain     BP has been running 130-140 / 90's on average   Has checked BP a total of 3-4 x at home   Has used a wrist cuff   Does admit to increased stress   Declines any supplements or stimulants   Non smoker   Doesn't exercise on a regular basis  Weight is decreased by 5 lbs     Health Maintenance:      Subjective:     Review of Systems   Constitutional: Negative. Negative for appetite change, chills, diaphoresis and fever. HENT: Negative. Eyes: Negative. Respiratory: Negative. Cardiovascular: Negative. Gastrointestinal: Negative. Negative for abdominal pain, diarrhea, nausea and vomiting. Endocrine: Negative. Genitourinary: Positive for scrotal swelling (R sided ). Negative for difficulty urinating, discharge, dysuria, flank pain, frequency, genital sores, hematuria, penile pain, penile swelling, testicular pain and urgency. Musculoskeletal: Positive for back pain (chronic unchanged). Skin: Negative. Negative for color change, pallor, rash and wound. Allergic/Immunologic: Negative. Neurological: Negative. Negative for seizures, syncope and facial asymmetry. Hematological: Negative. Psychiatric/Behavioral: Negative. Objective:     Vitals:    06/29/21 0720   BP: (!) 134/100   Pulse: 78   Temp: 97.6 °F (36.4 °C)   SpO2: 98%     Vitals:    06/29/21 0742   BP: (!) 122/100   Pulse:    Temp:    SpO2:        Body mass index is 30.35 kg/m². Physical Exam  Exam conducted with a chaperone present. Constitutional:       General: He is not in acute distress. Appearance: Normal appearance. He is well-developed. He is not ill-appearing, toxic-appearing or diaphoretic. HENT:      Head: Normocephalic and atraumatic. Right Ear: External ear normal.      Left Ear: External ear normal.      Nose: Nose normal.   Eyes:      General: No scleral icterus. Right eye: No discharge. Left eye: No discharge. Conjunctiva/sclera: Conjunctivae normal.      Pupils: Pupils are equal, round, and reactive to light.    Neck:      Trachea: No tracheal deviation. Cardiovascular:      Rate and Rhythm: Normal rate and regular rhythm. Heart sounds: Normal heart sounds. No murmur heard. No friction rub. No gallop. Pulmonary:      Effort: Pulmonary effort is normal. No tachypnea, accessory muscle usage or respiratory distress. Breath sounds: Normal breath sounds. No stridor. No decreased breath sounds, wheezing, rhonchi or rales. Abdominal:      Palpations: Abdomen is soft. Genitourinary:     Testes:         Right: Mass (possible pea sized mass) and tenderness present. Swelling not present. Right testis is descended. Left: Mass, tenderness or swelling not present. Left testis is descended. Epididymis:      Right: Normal. Not inflamed. Left: Normal. Not inflamed. Musculoskeletal:         General: No tenderness or deformity. Normal range of motion. Cervical back: Normal range of motion and neck supple. Skin:     General: Skin is warm and dry. Coloration: Skin is not pale. Findings: No erythema or rash. Neurological:      Mental Status: He is alert and oriented to person, place, and time. GCS: GCS eye subscore is 4. GCS verbal subscore is 5. GCS motor subscore is 6. Gait: Gait is intact. Gait normal.   Psychiatric:         Speech: Speech normal.         Behavior: Behavior normal.         Thought Content: Thought content normal.         Judgment: Judgment normal.           Assessment:         1. Testicular pain, right    2. Elevated blood pressure reading        Plan:     1. Testicular pain, right    - US SCROTUM AND TESTICLES; Future    Complete ultrasound  Follow-up pending results     2.  Elevated blood pressure reading    BP above goal, possibly situational related   Encouraged home BP monitoring for goal < 140/90  Lifestyle modifications discussed  Handouts given  Follow-up in 6 weeks or sooner PRN    Weight reduction - can decrease blood pressure by 5-20 points per 10 kg of weight loss DASH Diet - consume diet rich in fruits, vegetables, and low-fat dairy products with a reduced content of saturated and total fat  Dietary sodium restriction - Reduce dietary sodium intake to no more than 100meq/day (2.4 g sodium)  Physical activity - Engage in regular aerobic physical activity routinely (150min/wk or 30 minutes most days) such as brisk walking, swimming, running, cycling  Moderate consumption of alcohol - limit to more than 2 drinks/day in most men and 1 drink/day in women   Smoking cessation     Credit - UpToDate     Discussed use, benefit, and side effects of prescribed medications. All patient questions answered. Pt voiced understanding. Reviewed health maintenance. Instructed to continue current medications, diet and exercise. Patient agreedwith treatment plan. Follow up as directed.      Electronically signed by CHAZ Hudson CNP on 6/29/2021

## 2021-07-29 DIAGNOSIS — N50.811 TESTICULAR PAIN, RIGHT: ICD-10-CM

## 2021-07-30 DIAGNOSIS — I86.1 BILATERAL VARICOCELES: Primary | ICD-10-CM

## 2021-08-02 ENCOUNTER — OFFICE VISIT (OUTPATIENT)
Dept: FAMILY MEDICINE CLINIC | Age: 31
End: 2021-08-02
Payer: COMMERCIAL

## 2021-08-02 VITALS
HEART RATE: 89 BPM | WEIGHT: 222.6 LBS | DIASTOLIC BLOOD PRESSURE: 92 MMHG | BODY MASS INDEX: 30.19 KG/M2 | OXYGEN SATURATION: 98 % | TEMPERATURE: 97.8 F | SYSTOLIC BLOOD PRESSURE: 144 MMHG

## 2021-08-02 DIAGNOSIS — H60.92 RECURRENT OTITIS EXTERNA OF LEFT EAR: Primary | ICD-10-CM

## 2021-08-02 DIAGNOSIS — R03.0 ELEVATED BLOOD PRESSURE READING: ICD-10-CM

## 2021-08-02 DIAGNOSIS — H66.005 RECURRENT ACUTE SUPPURATIVE OTITIS MEDIA WITHOUT SPONTANEOUS RUPTURE OF LEFT TYMPANIC MEMBRANE: ICD-10-CM

## 2021-08-02 DIAGNOSIS — I86.1 BILATERAL VARICOCELES: ICD-10-CM

## 2021-08-02 PROCEDURE — 99214 OFFICE O/P EST MOD 30 MIN: CPT | Performed by: NURSE PRACTITIONER

## 2021-08-02 RX ORDER — CEFDINIR 300 MG/1
300 CAPSULE ORAL 2 TIMES DAILY
Qty: 20 CAPSULE | Refills: 0 | Status: SHIPPED | OUTPATIENT
Start: 2021-08-02 | End: 2021-08-12

## 2021-08-02 RX ORDER — AZELASTINE 1 MG/ML
SPRAY, METERED NASAL 2 TIMES DAILY
COMMUNITY
Start: 2021-07-01

## 2021-08-02 ASSESSMENT — ENCOUNTER SYMPTOMS
GASTROINTESTINAL NEGATIVE: 1
EYES NEGATIVE: 1
RESPIRATORY NEGATIVE: 1
ABDOMINAL PAIN: 0
SORE THROAT: 0
NAUSEA: 0
RHINORRHEA: 0
FACIAL SWELLING: 0
VOICE CHANGE: 0
ALLERGIC/IMMUNOLOGIC NEGATIVE: 1
DIARRHEA: 0
COUGH: 0
VOMITING: 0
COLOR CHANGE: 0

## 2021-08-02 NOTE — PROGRESS NOTES
[facility]  [address] [place] [address]  Dept: [phone]    Estefania Booth is a 27 y.o. male who presents today for his medical conditions/complaintsas noted below. Estefania Booth is here today c/o Otalgia (left started Thursday)    Past Medical History:   Diagnosis Date    Chronic sinusitis     Concussion     s/p falling off ladder    Kidney stone     Lemiere syndrome     POTS (postural orthostatic tachycardia syndrome)     Pulmonary emboli (HCC)     States \"17\" PE's      Past Surgical History:   Procedure Laterality Date    LITHOTRIPSY     Myriam Older      Dr. Helen Jimenez       Family History   Problem Relation Age of Onset    Heart Disease Maternal Grandfather         triple bypass but a smoker    Other Sister         P.O. T.S    Asthma Brother     Asthma Sister        Social History     Tobacco Use    Smoking status: Never Smoker    Smokeless tobacco: Never Used   Substance Use Topics    Alcohol use: Yes     Comment: Social      Current Outpatient Medications   Medication Sig Dispense Refill    azelastine (ASTELIN) 0.1 % nasal spray 2 times daily      fluticasone (FLONASE) 50 MCG/ACT nasal spray 1 spray by Each Nostril route daily      Loratadine (CLARITIN PO) Take by mouth daily      montelukast (SINGULAIR) 10 MG tablet Take 10 mg by mouth nightly      vitamin D-3 (CHOLECALCIFEROL) 5000 UNITS TABS Take 5,000 Units by mouth daily       No current facility-administered medications for this visit. Allergies   Allergen Reactions    Latex     Bupropion      Other reaction(s): Headache    Sulfa Antibiotics          HPI:     Otalgia   There is pain in the left ear. This is a recurrent problem. The current episode started in the past 7 days (Thursday (5 days) ). The problem occurs constantly. The problem has been gradually worsening. There has been no fever. The pain is at a severity of 10/10. The pain is severe.  Pertinent negatives include no abdominal pain, coughing, diarrhea, ear discharge, headaches, hearing loss, neck pain, rash, rhinorrhea, sore throat or vomiting. He has tried ear drops (Cipro drops July 1-11th - RESOLVED , restarted within the past week, started leftover drops on Friday, started Neomyacin drops on Saturday from [facility]) for the symptoms. The treatment provided no relief. His past medical history is significant for a chronic ear infection. There is no history of hearing loss or a tympanostomy tube. Last ear infection 1 month ago (Amoxil & Cipro drops)   Follows [facility]  Has been suggested that he gets ear tubes   Unsure why he gets recurrent infections    Ultrasound testicles showed bilateral varicocele(s)  Hasn't seen Urologist yet     Health Maintenance:      Subjective:     Review of Systems   Constitutional: Negative. Negative for appetite change, chills, diaphoresis and fever. HENT: Positive for ear pain and tinnitus. Negative for congestion, ear discharge, facial swelling, hearing loss, rhinorrhea, sore throat and voice change. Eyes: Negative. Respiratory: Negative. Negative for cough. Cardiovascular: Negative. Gastrointestinal: Negative. Negative for abdominal pain, diarrhea, nausea and vomiting. Endocrine: Negative. Genitourinary: Negative. Negative for difficulty urinating and dysuria. Musculoskeletal: Negative. Negative for neck pain. Skin: Negative. Negative for color change, pallor, rash and wound. Allergic/Immunologic: Negative. Neurological: Positive for dizziness (mild). Negative for seizures, speech difficulty, weakness and headaches. Hematological: Negative. Psychiatric/Behavioral: The patient is nervous/anxious. Objective:     Vitals:    08/02/21 1615   BP: (!) 150/94   Pulse: 89   Temp: 97.8 °F (36.6 °C)   SpO2: 98%     Vitals:    08/02/21 1630   BP: (!) 144/92   Pulse:    Temp:    SpO2:        Body mass index is 30.19 kg/m².       Physical Exam  Constitutional: General: He is not in acute distress. Appearance: Normal appearance. He is well-developed. He is not ill-appearing, toxic-appearing or diaphoretic. Comments: Appears uncomfortable    HENT:      Head: Normocephalic and atraumatic. Right Ear: External ear normal. There is no impacted cerumen. Tympanic membrane is scarred. Left Ear: External ear normal. Drainage, swelling and tenderness present. There is no impacted cerumen. Tympanic membrane is injected, erythematous and bulging. Nose: Nose normal.   Eyes:      General: No scleral icterus. Right eye: No discharge. Left eye: No discharge. Conjunctiva/sclera: Conjunctivae normal.      Pupils: Pupils are equal, round, and reactive to light. Neck:      Trachea: No tracheal deviation. Cardiovascular:      Rate and Rhythm: Normal rate and regular rhythm. Heart sounds: Normal heart sounds. No murmur heard. No friction rub. No gallop. Pulmonary:      Effort: Pulmonary effort is normal. No tachypnea, accessory muscle usage or respiratory distress. Breath sounds: Normal breath sounds. No stridor. No decreased breath sounds, wheezing, rhonchi or rales. Abdominal:      Palpations: Abdomen is soft. Musculoskeletal:         General: No tenderness or deformity. Normal range of motion. Cervical back: Normal range of motion and neck supple. Skin:     General: Skin is warm and dry. Coloration: Skin is not pale. Findings: No erythema or rash. Neurological:      Mental Status: He is alert and oriented to person, place, and time. GCS: GCS eye subscore is 4. GCS verbal subscore is 5. GCS motor subscore is 6. Gait: Gait is intact. Gait normal.   Psychiatric:         Speech: Speech normal.         Behavior: Behavior normal.         Thought Content: Thought content normal.         Judgment: Judgment normal.           Assessment:         1. Recurrent otitis externa of left ear    2.  Recurrent acute

## 2021-08-26 ENCOUNTER — OFFICE VISIT (OUTPATIENT)
Dept: FAMILY MEDICINE CLINIC | Age: 31
End: 2021-08-26
Payer: COMMERCIAL

## 2021-08-26 VITALS
HEART RATE: 88 BPM | WEIGHT: 225 LBS | TEMPERATURE: 97.6 F | DIASTOLIC BLOOD PRESSURE: 98 MMHG | OXYGEN SATURATION: 100 % | BODY MASS INDEX: 30.52 KG/M2 | SYSTOLIC BLOOD PRESSURE: 132 MMHG

## 2021-08-26 DIAGNOSIS — T78.40XA ALLERGIC REACTION, INITIAL ENCOUNTER: ICD-10-CM

## 2021-08-26 DIAGNOSIS — I10 ESSENTIAL HYPERTENSION: Primary | ICD-10-CM

## 2021-08-26 PROCEDURE — 99214 OFFICE O/P EST MOD 30 MIN: CPT | Performed by: NURSE PRACTITIONER

## 2021-08-26 PROCEDURE — 96372 THER/PROPH/DIAG INJ SC/IM: CPT | Performed by: NURSE PRACTITIONER

## 2021-08-26 RX ORDER — METHYLPREDNISOLONE SODIUM SUCCINATE 125 MG/2ML
125 INJECTION, POWDER, LYOPHILIZED, FOR SOLUTION INTRAMUSCULAR; INTRAVENOUS ONCE
Status: COMPLETED | OUTPATIENT
Start: 2021-08-26 | End: 2021-08-26

## 2021-08-26 RX ORDER — CIPROFLOXACIN AND DEXAMETHASONE 3; 1 MG/ML; MG/ML
SUSPENSION/ DROPS AURICULAR (OTIC) 2 TIMES DAILY
COMMUNITY
Start: 2021-07-01 | End: 2021-09-16 | Stop reason: ALTCHOICE

## 2021-08-26 RX ORDER — DOXYCYCLINE HYCLATE 100 MG
TABLET ORAL 2 TIMES DAILY
COMMUNITY
Start: 2021-08-09 | End: 2021-09-16 | Stop reason: ALTCHOICE

## 2021-08-26 RX ORDER — EPINEPHRINE 0.3 MG/.3ML
0.3 INJECTION SUBCUTANEOUS
Qty: 1 EACH | Refills: 1 | Status: SHIPPED | OUTPATIENT
Start: 2021-08-26 | End: 2021-08-26

## 2021-08-26 RX ORDER — AMLODIPINE BESYLATE 5 MG/1
5 TABLET ORAL DAILY
Qty: 30 TABLET | Refills: 2 | Status: SHIPPED | OUTPATIENT
Start: 2021-08-26 | End: 2021-09-16

## 2021-08-26 RX ADMIN — METHYLPREDNISOLONE SODIUM SUCCINATE 125 MG: 125 INJECTION, POWDER, LYOPHILIZED, FOR SOLUTION INTRAMUSCULAR; INTRAVENOUS at 10:18

## 2021-08-26 ASSESSMENT — ENCOUNTER SYMPTOMS
ALLERGIC/IMMUNOLOGIC NEGATIVE: 1
SORE THROAT: 0
RESPIRATORY NEGATIVE: 1
EYES NEGATIVE: 1
WHEEZING: 0
GASTROINTESTINAL NEGATIVE: 1
CHEST TIGHTNESS: 0
VOMITING: 0
TROUBLE SWALLOWING: 1
NAUSEA: 0
VOICE CHANGE: 0
DIARRHEA: 0
SHORTNESS OF BREATH: 0
RHINORRHEA: 0
STRIDOR: 0
CHOKING: 0
COLOR CHANGE: 0
COUGH: 0

## 2021-08-26 ASSESSMENT — PATIENT HEALTH QUESTIONNAIRE - PHQ9
SUM OF ALL RESPONSES TO PHQ QUESTIONS 1-9: 0
1. LITTLE INTEREST OR PLEASURE IN DOING THINGS: 0
SUM OF ALL RESPONSES TO PHQ QUESTIONS 1-9: 0
SUM OF ALL RESPONSES TO PHQ9 QUESTIONS 1 & 2: 0
2. FEELING DOWN, DEPRESSED OR HOPELESS: 0
SUM OF ALL RESPONSES TO PHQ QUESTIONS 1-9: 0

## 2021-08-26 NOTE — PATIENT INSTRUCTIONS
Weight reduction - can decrease blood pressure by 5-20 points per 10 kg of weight loss   DASH Diet - consume diet rich in fruits, vegetables, and low-fat dairy products with a reduced content of saturated and total fat  Dietary sodium restriction - Reduce dietary sodium intake to no more than 100meq/day (2.4 g sodium)  Physical activity - Engage in regular aerobic physical activity routinely (150min/wk or 30 minutes most days) such as brisk walking, swimming, running, cycling  Moderate consumption of alcohol - limit to more than 2 drinks/day in most men and 1 drink/day in women   Smoking cessation     Credit - UpToDate   Patient Education        High Blood Pressure: Care Instructions  Overview     It's normal for blood pressure to go up and down throughout the day. But if it stays up, you have high blood pressure. Another name for high blood pressure is hypertension. Despite what a lot of people think, high blood pressure usually doesn't cause headaches or make you feel dizzy or lightheaded. It usually has no symptoms. But it does increase your risk of stroke, heart attack, and other problems. You and your doctor will talk about your risks of these problems based on your blood pressure. Your doctor will give you a goal for your blood pressure. Your goal will be based on your health and your age. Lifestyle changes, such as eating healthy and being active, are always important to help lower blood pressure. You might also take medicine to reach your blood pressure goal.  Follow-up care is a key part of your treatment and safety. Be sure to make and go to all appointments, and call your doctor if you are having problems. It's also a good idea to know your test results and keep a list of the medicines you take. How can you care for yourself at home? Medical treatment  · If you stop taking your medicine, your blood pressure will go back up. You may take one or more types of medicine to lower your blood pressure.  Be safe with medicines. Take your medicine exactly as prescribed. Call your doctor if you think you are having a problem with your medicine. · Talk to your doctor before you start taking aspirin every day. Aspirin can help certain people lower their risk of a heart attack or stroke. But taking aspirin isn't right for everyone, because it can cause serious bleeding. · See your doctor regularly. You may need to see the doctor more often at first or until your blood pressure comes down. · If you are taking blood pressure medicine, talk to your doctor before you take decongestants or anti-inflammatory medicine, such as ibuprofen. Some of these medicines can raise blood pressure. · Learn how to check your blood pressure at home. Lifestyle changes  · Stay at a healthy weight. This is especially important if you put on weight around the waist. Losing even 10 pounds can help you lower your blood pressure. · If your doctor recommends it, get more exercise. Walking is a good choice. Bit by bit, increase the amount you walk every day. Try for at least 30 minutes on most days of the week. You also may want to swim, bike, or do other activities. · Avoid or limit alcohol. Talk to your doctor about whether you can drink any alcohol. · Try to limit how much sodium you eat to less than 2,300 milligrams (mg) a day. Your doctor may ask you to try to eat less than 1,500 mg a day. · Eat plenty of fruits (such as bananas and oranges), vegetables, legumes, whole grains, and low-fat dairy products. · Lower the amount of saturated fat in your diet. Saturated fat is found in animal products such as milk, cheese, and meat. Limiting these foods may help you lose weight and also lower your risk for heart disease. · Do not smoke. Smoking increases your risk for heart attack and stroke. If you need help quitting, talk to your doctor about stop-smoking programs and medicines. These can increase your chances of quitting for good.   When should you call for help? Call 911  anytime you think you may need emergency care. This may mean having symptoms that suggest that your blood pressure is causing a serious heart or blood vessel problem. Your blood pressure may be over 180/120. For example, call 911 if:    · You have symptoms of a heart attack. These may include:  ? Chest pain or pressure, or a strange feeling in the chest.  ? Sweating. ? Shortness of breath. ? Nausea or vomiting. ? Pain, pressure, or a strange feeling in the back, neck, jaw, or upper belly or in one or both shoulders or arms. ? Lightheadedness or sudden weakness. ? A fast or irregular heartbeat.     · You have symptoms of a stroke. These may include:  ? Sudden numbness, tingling, weakness, or loss of movement in your face, arm, or leg, especially on only one side of your body. ? Sudden vision changes. ? Sudden trouble speaking. ? Sudden confusion or trouble understanding simple statements. ? Sudden problems with walking or balance. ? A sudden, severe headache that is different from past headaches.     · You have severe back or belly pain. Do not wait until your blood pressure comes down on its own. Get help right away. Call your doctor now or seek immediate care if:    · Your blood pressure is much higher than normal (such as 180/120 or higher), but you don't have symptoms.     · You think high blood pressure is causing symptoms, such as:  ? Severe headache.  ? Blurry vision. Watch closely for changes in your health, and be sure to contact your doctor if:    · Your blood pressure measures higher than your doctor recommends at least 2 times. That means the top number is higher or the bottom number is higher, or both.     · You think you may be having side effects from your blood pressure medicine. Where can you learn more? Go to [email or web address]. org and sign in to your [employer] account.  Enter E186 in the bTendo box to learn more about \"High Blood Pressure: Care Instructions. \"     If you do not have an account, please click on the \"Sign Up Now\" link. Current as of: August 31, 2020               Content Version: 12.9  © 2006-2021 Healthwise, Incorporated. Care instructions adapted under license by [facility] ([facility]). If you have questions about a medical condition or this instruction, always ask your healthcare professional. [address] any warranty or liability for your use of this information.

## 2021-08-26 NOTE — PROGRESS NOTES
[facility]  [address]  Dept: [phone]    Kindra Kelly is a 27 y.o. male who presents today for his medical conditions/complaintsas noted below. Kindra Kelly is here today c/o Blood Pressure Check and Choking (throat feels swollen)    Past Medical History:   Diagnosis Date    Chronic sinusitis     Concussion     s/p falling off ladder    Kidney stone     Lemiere syndrome     POTS (postural orthostatic tachycardia syndrome)     Pulmonary emboli (HCC)     States \"17\" PE's      Past Surgical History:   Procedure Laterality Date    LITHOTRIPSY     Lainey Nevarez       Family History   Problem Relation Age of Onset    Heart Disease Maternal Grandfather         triple bypass but a smoker    Other Sister         P.O. T.S    Asthma Brother     Asthma Sister        Social History     Tobacco Use    Smoking status: Never Smoker    Smokeless tobacco: Never Used   Substance Use Topics    Alcohol use: Yes     Comment: Social      Current Outpatient Medications   Medication Sig Dispense Refill    ciprofloxacin-dexamethasone (CIPRODEX) 0.3-0.1 % otic suspension 2 times daily      doxycycline hyclate (VIBRA-TABS) 100 MG tablet 2 times daily      azelastine (ASTELIN) 0.1 % nasal spray 2 times daily      fluticasone (FLONASE) 50 MCG/ACT nasal spray 1 spray by Each Nostril route daily      Loratadine (CLARITIN PO) Take by mouth daily      montelukast (SINGULAIR) 10 MG tablet Take 10 mg by mouth nightly      vitamin D-3 (CHOLECALCIFEROL) 5000 UNITS TABS Take 5,000 Units by mouth daily       No current facility-administered medications for this visit.      Allergies   Allergen Reactions    Latex     Bupropion      Other reaction(s): Headache    Sulfa Antibiotics          HPI:     HPI    Presents today c/o follow-up HTN  BP running 130 / 's on average at home   Doesn't follow a DASH diet   Weight has been stable   Doesn't exercise on a regular basis   Caffeine intake is 4 shots of espresso per day   Diastolic has been elevated as far back as he can remember     C/o possible allergic reaction  Symptoms started > 1 week ago after starting doxycycline   Symptoms seemed to improve and then restart a few days ago  He will finish abx tomorrow from ENT for MRSA of ear   Admits to sensation of throat irritation & something being stuck in his throat  Cannot relate it to any certain food or other medication(s)   Declines any associated fever/chills, cough, congestion, sore throat, difficulty breathing, stridor, rash, facial, tongue or lip swelling  Hasn't tried anything at home for symptoms      Health Maintenance:      Subjective:     Review of Systems   Constitutional: Negative. Negative for appetite change, chills, diaphoresis and fever. HENT: Positive for trouble swallowing. Negative for congestion, drooling, ear discharge, ear pain, postnasal drip, rhinorrhea, sore throat and voice change. Eyes: Negative. Respiratory: Negative. Negative for cough, choking, chest tightness, shortness of breath, wheezing and stridor. Cardiovascular: Negative. Gastrointestinal: Negative. Negative for diarrhea, nausea and vomiting. Endocrine: Negative. Genitourinary: Negative. Negative for difficulty urinating and enuresis. Musculoskeletal: Negative. Skin: Negative. Negative for color change, pallor, rash and wound. Allergic/Immunologic: Negative. Neurological: Negative. Negative for seizures, syncope and facial asymmetry. Hematological: Negative. Psychiatric/Behavioral: Negative. Objective:     Vitals:    08/26/21 0953   BP: (!) 138/90   Pulse: 88   Temp: 97.6 °F (36.4 °C)   SpO2: 100%     Vitals:    08/26/21 1010   BP: (!) 132/98   Pulse:    Temp:    SpO2:        Body mass index is 30.52 kg/m². Physical Exam  Constitutional:       General: He is not in acute distress. Appearance: Normal appearance.  He is normal.         Behavior: Behavior normal.         Thought Content: Thought content normal.         Judgment: Judgment normal.           Assessment:         1. Essential hypertension    2. Allergic reaction, initial encounter        Plan:     1. Essential hypertension    - amLODIPine (NORVASC) 5 MG tablet; Take 1 tablet by mouth daily  Dispense: 30 tablet; Refill: 2  - CBC Auto Differential; Future  - Comprehensive Metabolic Panel; Future  - TSH with Reflex; Future  - Lipid Panel; Future    BP above goal  START amlodipine, side effects discussed  Update lab work for monitoring purposes  Lifestyle modifications encouraged  F/u 3 weeks  Encouraged home BP monitoring for goal < 140/90     Weight reduction - can decrease blood pressure by 5-20 points per 10 kg of weight loss   DASH Diet - consume diet rich in fruits, vegetables, and low-fat dairy products with a reduced content of saturated and total fat  Dietary sodium restriction - Reduce dietary sodium intake to no more than 100meq/day (2.4 g sodium)  Physical activity - Engage in regular aerobic physical activity routinely (150min/wk or 30 minutes most days) such as brisk walking, swimming, running, cycling  Moderate consumption of alcohol - limit to more than 2 drinks/day in most men and 1 drink/day in women   Smoking cessation     Credit - UpToDate     2. Allergic reaction, initial encounter    - Dale Tracy MD, Allergy & Immunology, [place]  - EPINEPHrine (EPIPEN 2-LACI) 0.3 MG/0.3ML SOAJ injection; Inject 0.3 mLs into the skin once as needed (anaphylaxsis)  Dispense: 1 each; Refill: 1  - methylPREDNISolone sodium (SOLU-MEDROL) injection 125 mg    Recommended OTC antihistamine/benadryl   Allergist referral for further investigation / testing   Monitor for s/s resolution with doxy d/c   Red flag s/s discussed, ER if these develop   Side effects of steroid discussed     Discussed use, benefit, and side effects of prescribed medications. All patient questions answered. Pt voiced understanding. Reviewed health maintenance. Instructed to continue current medications, diet and exercise. Patient agreedwith treatment plan. Follow up as directed.      Electronically signed by CHAZ Watson CNP on 8/26/2021

## 2021-09-16 ENCOUNTER — OFFICE VISIT (OUTPATIENT)
Dept: FAMILY MEDICINE CLINIC | Age: 31
End: 2021-09-16
Payer: COMMERCIAL

## 2021-09-16 VITALS
WEIGHT: 225.8 LBS | DIASTOLIC BLOOD PRESSURE: 95 MMHG | BODY MASS INDEX: 30.62 KG/M2 | SYSTOLIC BLOOD PRESSURE: 132 MMHG | HEART RATE: 76 BPM | OXYGEN SATURATION: 96 %

## 2021-09-16 DIAGNOSIS — I10 ESSENTIAL HYPERTENSION: Primary | ICD-10-CM

## 2021-09-16 PROBLEM — R03.0 ELEVATED BLOOD PRESSURE READING: Status: RESOLVED | Noted: 2021-06-29 | Resolved: 2021-09-16

## 2021-09-16 PROCEDURE — 99214 OFFICE O/P EST MOD 30 MIN: CPT | Performed by: NURSE PRACTITIONER

## 2021-09-16 RX ORDER — AMLODIPINE BESYLATE 10 MG/1
10 TABLET ORAL DAILY
Qty: 30 TABLET | Refills: 2 | Status: SHIPPED | OUTPATIENT
Start: 2021-09-16

## 2021-09-16 ASSESSMENT — ENCOUNTER SYMPTOMS
GASTROINTESTINAL NEGATIVE: 1
RESPIRATORY NEGATIVE: 1
NAUSEA: 0
WHEEZING: 0
VOMITING: 0
COLOR CHANGE: 0
CHEST TIGHTNESS: 0
SHORTNESS OF BREATH: 0
DIARRHEA: 0
ALLERGIC/IMMUNOLOGIC NEGATIVE: 1
COUGH: 0
EYES NEGATIVE: 1

## 2021-09-16 NOTE — PROGRESS NOTES
[facility]  [address]  Dept: [phone]    Crystal Damon is a 27 y.o. male who presents today for his medical conditions/complaintsas noted below. Crystal Damon is here today c/o Hypertension    Past Medical History:   Diagnosis Date    Chronic sinusitis     Concussion     s/p falling off ladder    Kidney stone     Lemiere syndrome     POTS (postural orthostatic tachycardia syndrome)     Pulmonary emboli (HCC)     States \"17\" PE's      Past Surgical History:   Procedure Laterality Date    LITHOTRIPSY     Rafaelajett Quintana       Family History   Problem Relation Age of Onset    Heart Disease Maternal Grandfather         triple bypass but a smoker    Other Sister         P.O. T.S    Asthma Brother     Asthma Sister        Social History     Tobacco Use    Smoking status: Never Smoker    Smokeless tobacco: Never Used   Substance Use Topics    Alcohol use: Yes     Comment: Social      Current Outpatient Medications   Medication Sig Dispense Refill    amLODIPine (NORVASC) 5 MG tablet Take 1 tablet by mouth daily 30 tablet 2    azelastine (ASTELIN) 0.1 % nasal spray 2 times daily      fluticasone (FLONASE) 50 MCG/ACT nasal spray 1 spray by Each Nostril route daily      Loratadine (CLARITIN PO) Take by mouth daily      montelukast (SINGULAIR) 10 MG tablet Take 10 mg by mouth nightly      EPINEPHrine (EPIPEN 2-LACI) 0.3 MG/0.3ML SOAJ injection Inject 0.3 mLs into the skin once as needed (anaphylaxsis) 1 each 1     No current facility-administered medications for this visit.      Allergies   Allergen Reactions    Latex     Bupropion      Other reaction(s): Headache    Sulfa Antibiotics          HPI:     HPI    Presents today c/o follow-up HTN  Started amlodipine last visit  Tolerating medication without side effects   Weight is stable  BP running 120 / 80's on average at home , higher in the morning   Recently ordered a treadmill , plans to start exercising   Diet is normal , doesn't follow DASH diet   Caffeine intake is 4 shots of espresso per day    Health Maintenance:      Subjective:     Review of Systems   Constitutional: Negative. Negative for appetite change, chills, diaphoresis, fatigue, fever and unexpected weight change. HENT: Negative. Eyes: Negative. Respiratory: Negative. Negative for cough, chest tightness, shortness of breath and wheezing. Cardiovascular: Negative. Negative for chest pain and leg swelling. Gastrointestinal: Negative. Negative for diarrhea, nausea and vomiting. Endocrine: Negative. Genitourinary: Negative. Negative for difficulty urinating and dysuria. Musculoskeletal: Negative. Skin: Negative. Negative for color change, pallor, rash and wound. Allergic/Immunologic: Negative. Neurological: Negative. Negative for dizziness, seizures, syncope, light-headedness and headaches. Hematological: Negative. Psychiatric/Behavioral: Negative. Negative for dysphoric mood. The patient is not nervous/anxious. Objective:       Vitals:    09/16/21 0844 09/16/21 0857 09/16/21 0858   BP: (!) 120/90 (!) 136/100 (!) 132/95   Site: Left Upper Arm     Position: Sitting     Cuff Size: Small Adult     Pulse: 76     SpO2: 96%     Weight: 225 lb 12.8 oz (102.4 kg)         Body mass index is 30.62 kg/m². Physical Exam  Constitutional:       General: He is not in acute distress. Appearance: Normal appearance. He is well-developed. He is not ill-appearing, toxic-appearing or diaphoretic. HENT:      Head: Normocephalic and atraumatic. Right Ear: Tympanic membrane, ear canal and external ear normal.      Left Ear: Tympanic membrane, ear canal and external ear normal.      Nose: Nose normal.   Eyes:      General: No scleral icterus. Right eye: No discharge. Left eye: No discharge.       Conjunctiva/sclera: Conjunctivae normal.      Pupils: Pupils are equal, round, and swimming, running, cycling  Moderate consumption of alcohol - limit to more than 2 drinks/day in most men and 1 drink/day in women   Smoking cessation     Credit - UpToDate     Discussed use, benefit, and side effects of prescribed medications. All patient questions answered. Pt voiced understanding. Reviewed health maintenance. Instructed to continue current medications, diet and exercise. Patient agreedwith treatment plan. Follow up as directed.      Electronically signed by CHAZ Watson CNP on 9/16/2021

## 2021-12-06 ENCOUNTER — NURSE TRIAGE (OUTPATIENT)
Dept: OTHER | Facility: CLINIC | Age: 31
End: 2021-12-06

## 2021-12-06 NOTE — TELEPHONE ENCOUNTER
Reason for Disposition   Continuous (nonstop) coughing interferes with work or school and no improvement using cough treatment per Care Advice    Answer Assessment - Initial Assessment Questions  1. ONSET: \"When did the cough begin? \"       Two weeks ago- tested negative for flu and Covid    2. SEVERITY: \"How bad is the cough today? \"       Cough is keeping patient up at night. 3. RESPIRATORY DISTRESS: \"Describe your breathing. \"       SOB with coughing and \"a little SOB\" with movement- receives allergy shots and is asthmatic    4. FEVER: \"Do you have a fever? \" If so, ask: \"What is your temperature, how was it measured, and when did it start? \"      Denies- Bactrim took way fever from infection in leg    5. HEMOPTYSIS: \"Are you coughing up any blood? \" If so ask: \"How much? \" (flecks, streaks, tablespoons, etc.)     Denies    6. TREATMENT: \"What have you done so far to treat the cough? \" (e.g., meds, fluids, humidifier)      Drinking hot water, sinus rinses (3X a week)    7. CARDIAC HISTORY: \"Do you have any history of heart disease? \" (e.g., heart attack, congestive heart failure)       HTN    8. LUNG HISTORY: \"Do you have any history of lung disease? \"  (e.g., pulmonary embolus, asthma, emphysema)      \"boderline\" asthmatic- no rescue inhaler, has been on Advair for URI's    9. PE RISK FACTORS: \"Do you have a history of blood clots? 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 OTHER SYMPTOMS: \"Do you have any other symptoms? (e.g., runny nose, wheezing, chest pain)       Taking Keflex for ingrown hair on groin- saw [facility] two weeks ago. Was seen at Urgent care one week ago- put on Bactrim    11. PREGNANCY: \"Is there any chance you are pregnant? \" \"When was your last menstrual period? \"        N/A    12. TRAVEL: \"Have you traveled out of the country in the last month? \" (e.g., travel history, exposures)        N/A    Protocols used: COUGH-ADULT-OH    Received call from Renetta at [facility] with [employer]. Brief description of triage: See above. Triage indicates for patient to be seen today or tomorrow. Care advice provided, patient verbalizes understanding; denies any other questions or concerns; instructed to call back for any new or worsening symptoms. Writer provided warm transfer to Marcus Jeter at [facility] for appointment scheduling. Attention Provider: Thank you for allowing me to participate in the care of your patient. The patient was connected to triage in response to information provided to the ECC/PSC. Please do not respond through this encounter as the response is not directed to a shared pool.

## 2022-08-17 ENCOUNTER — OFFICE VISIT (OUTPATIENT)
Dept: INFECTIOUS DISEASES | Age: 32
End: 2022-08-17
Payer: COMMERCIAL

## 2022-08-17 VITALS
HEART RATE: 115 BPM | TEMPERATURE: 99.9 F | HEIGHT: 72 IN | DIASTOLIC BLOOD PRESSURE: 86 MMHG | WEIGHT: 235.8 LBS | SYSTOLIC BLOOD PRESSURE: 139 MMHG | BODY MASS INDEX: 31.94 KG/M2

## 2022-08-17 DIAGNOSIS — L02.92 BOILS OF MULTIPLE SITES: Primary | ICD-10-CM

## 2022-08-17 DIAGNOSIS — K59.1 FUNCTIONAL DIARRHEA: ICD-10-CM

## 2022-08-17 DIAGNOSIS — R21 SKIN RASH: ICD-10-CM

## 2022-08-17 DIAGNOSIS — H60.331 CHRONIC SWIMMER'S EAR OF RIGHT SIDE: ICD-10-CM

## 2022-08-17 PROCEDURE — 99204 OFFICE O/P NEW MOD 45 MIN: CPT | Performed by: INTERNAL MEDICINE

## 2022-08-17 RX ORDER — FLUTICASONE PROPIONATE AND SALMETEROL 250; 50 UG/1; UG/1
1 POWDER RESPIRATORY (INHALATION) EVERY 12 HOURS
COMMUNITY

## 2022-08-17 RX ORDER — CIPROFLOXACIN AND DEXAMETHASONE 3; 1 MG/ML; MG/ML
4 SUSPENSION/ DROPS AURICULAR (OTIC) 2 TIMES DAILY
Qty: 1 EACH | Refills: 2 | Status: SHIPPED | OUTPATIENT
Start: 2022-08-17 | End: 2022-08-24

## 2022-08-17 RX ORDER — CIPROFLOXACIN/HYDROCORTISONE 0.2 %-1 %
4 SUSPENSION, DROPS(FINAL DOSAGE FORM)(ML) OTIC (EAR) 2 TIMES DAILY
Qty: 1 EACH | Refills: 2 | Status: SHIPPED | OUTPATIENT
Start: 2022-08-17 | End: 2022-08-24

## 2022-08-17 ASSESSMENT — ENCOUNTER SYMPTOMS
ABDOMINAL DISTENTION: 0
EYE DISCHARGE: 0
COLOR CHANGE: 0
APNEA: 0

## 2022-08-17 NOTE — PATIENT INSTRUCTIONS
8/17/22mm: Pt will get labs done at [facility] lab. Pt was provided with lab orders, culture swab, supplies to obtain stool sample. Referral and contact information, [facility] ph: [phone] was provided to the pt at checkout. Referral was faxed to: [phone].

## 2022-08-17 NOTE — PROGRESS NOTES
[facility] - Initial Consult Note  Today's Date: 8/17/2022    Impression :   Recurrent boils  Past MRSDA hx   3 y monogamous - sex w man  Skin chronic rash - neoplastic ? Post AB persistent diarrhea  Swimmer s ear R > L recurrent    Sinus sx 2020 -   Past Lemierre 2009 and treated and recovered  Gets allergy shorts weekly, allergic to almost everything environemtal  Asthma  Covid in June 2022  Recommendations      Diagnosis Orders   1. Boils of multiple sites  Culture, MRSA, Screening    Clostridium Difficile Toxin/Antigen    Gastrointestinal Panel, Molecular    Culture, Aerobic    HIV Screen    T. Pallidum Ab    Chlamydia/GC DNA, Urine    mupirocin (BACTROBAN NASAL) 2 % nasal ointment    CBC with Auto Differential    Sedimentation Rate      2. Functional diarrhea  HIV Screen    T. Pallidum Ab    Chlamydia/GC DNA, Urine    mupirocin (BACTROBAN NASAL) 2 % nasal ointment      3. Skin rash  External Referral To Dermatology    Chlamydia/GC DNA, Urine      4. Chronic swimmer's ear of right side  ciprofloxacin-hydrocortisone (CIPRO HC) 0.2-1 % otic suspension          Return in about 2 months (around 10/17/2022).       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 Swimmer s ear R > L recurrent and now present    Plan;  Test the stools for C dff  and stool panel - keep probiotic  Nasal swab for MRSA and SA  Decontaminate - disc w pt decontamination   monogamous sex w man, get HIV and VDRL  2 small wart like lesions on R arm, ask derm to eval for possible basal cell  Cipro otic x 2 weeks      I have personally reviewed the past medical history, past surgical history, medications, social history, and family history, and I haveupdated the database accordingly. Past Medical History:     Past Medical History:   Diagnosis Date    Chronic sinusitis     Concussion     s/p falling off ladder    Kidney stone     Lemiere syndrome     POTS (postural orthostatic tachycardia syndrome)     Pulmonary emboli (HCC)     States \"17\" PE's       Past Surgical  History:     Past Surgical History:   Procedure Laterality Date    LITHOTRIPSY      SINUS SURGERY      Dr. Cordelia March       Medications:     Current Outpatient Medications:     fluticasone-salmeterol (ADVAIR DISKUS) 250-50 MCG/ACT AEPB diskus inhaler, Inhale 1 puff into the lungs every 12 hours, Disp: , Rfl:     mupirocin (BACTROBAN NASAL) 2 % nasal ointment, by Nasal route 2 times daily for 14 days Take by Nasal route 2 times daily. , Disp: 2 each, Rfl: 2    ciprofloxacin-hydrocortisone (CIPRO HC) 0.2-1 % otic suspension, Place 4 drops in ear(s) 2 times daily for 7 days, Disp: 1 each, Rfl: 2    ciprofloxacin-dexamethasone (CIPRODEX) 0.3-0.1 % otic suspension, Place 4 drops into both ears 2 times daily for 7 days, Disp: 1 each, Rfl: 2    amLODIPine (NORVASC) 10 MG tablet, Take 1 tablet by mouth daily, Disp: 30 tablet, Rfl: 2    azelastine (ASTELIN) 0.1 % nasal spray, 2 times daily, Disp: , Rfl:     fluticasone (FLONASE) 50 MCG/ACT nasal spray, 1 spray by Each Nostril route daily, Disp: , Rfl:     Loratadine (CLARITIN PO), Take by mouth daily, Disp: , Rfl:     montelukast (SINGULAIR) 10 MG tablet, Take 10 mg by mouth nightly, Disp: , Rfl:     EPINEPHrine (EPIPEN 2-LACI) 0.3 MG/0.3ML SOAJ injection, Inject 0.3 mLs into the skin once as needed (anaphylaxsis), Disp: 1 each, Rfl: 1      Social History:     Social History     Socioeconomic History    Marital status:      Spouse name: Not on file    Number of children: Not on file    Years of education: Not on file    Highest education level: Not on file   Occupational History    Not on file   Tobacco Use    Smoking status: Never    Smokeless tobacco: Never   Vaping Use    Vaping Use: Never used   Substance and Sexual Activity    Alcohol use: Yes     Comment: Social    Drug use: Yes     Types: Marijuana [place])     Comment: occasional    Sexual activity: Yes     Partners: Male     Comment: , Closed relationship. Last 6 month number 5,  100 or so life no   Other Topics Concern    Not on file   Social History Narrative    Not on file     Social Determinants of Health     Financial Resource Strain: Not on file   Food Insecurity: Not on file   Transportation Needs: Not on file   Physical Activity: Not on file   Stress: Not on file   Social Connections: Not on file   Intimate Partner Violence: Not on file   Housing Stability: Not on file       Family History:     Family History   Problem Relation Age of Onset    Heart Disease Maternal Grandfather         triple bypass but a smoker    Other Sister         P.O. T.S    Asthma Brother     Asthma Sister         Allergies:   Latex, Bupropion, and Sulfa antibiotics     Review of Systems:   Review of Systems   Constitutional:  Negative for activity change. HENT:  Positive for ear pain (R). Negative for congestion. Eyes:  Negative for discharge. Respiratory:  Negative for apnea. Cardiovascular:  Negative for chest pain. Gastrointestinal:  Negative for abdominal distention. Endocrine: Negative for heat intolerance. Genitourinary:  Negative for dysuria. Musculoskeletal:  Negative for arthralgias.    Skin:  Negative for color change. Allergic/Immunologic: Negative for immunocompromised state. Neurological:  Negative for dizziness. Hematological:  Negative for adenopathy. Psychiatric/Behavioral:  Negative for agitation. Physical Examination :   Blood pressure 139/86, pulse (!) 115, temperature 99.9 °F (37.7 °C), height 6' (1.829 m), weight 235 lb 12.8 oz (107 kg). Physical Exam  Constitutional:       Appearance: Normal appearance. HENT:      Head: Normocephalic and atraumatic. Ears:      Comments: R ear red canal. Swimmer s ear     Nose: Nose normal.      Mouth/Throat:      Mouth: Mucous membranes are moist.   Eyes:      General:         Left eye: No discharge. Conjunctiva/sclera: Conjunctivae normal.   Cardiovascular:      Rate and Rhythm: Normal rate and regular rhythm. Heart sounds: Normal heart sounds. No murmur heard. Pulmonary:      Effort: No respiratory distress. Breath sounds: Normal breath sounds. Abdominal:      General: There is no distension. Palpations: Abdomen is soft. Tenderness: There is no abdominal tenderness. Genitourinary:     Comments: No umaña  Musculoskeletal:         General: No swelling or deformity. Cervical back: Neck supple. No rigidity. Skin:     General: Skin is dry. Coloration: Skin is not jaundiced. Neurological:      General: No focal deficit present. Mental Status: He is alert. Mental status is at baseline. Cranial Nerves: No cranial nerve deficit. Psychiatric:         Mood and Affect: Mood normal.         Thought Content:  Thought content normal.         Medical Decision Making:   I have independently reviewed/ordered the following labs:    CBCwith Differential:   Lab Results   Component Value Date/Time    WBC 5.3 09/24/2019 12:00 AM    WBC 4.7 05/11/2015 12:08 PM    HGB 16.0 09/24/2019 12:00 AM    HGB 15.5 05/11/2015 12:08 PM    HCT 45.3 09/24/2019 12:00 AM    HCT 47.4 05/11/2015 12:08 PM     09/24/2019 12:00 AM  05/11/2015 12:08 PM    LYMPHOPCT 32.7 09/24/2019 12:00 AM    LYMPHOPCT 24 05/11/2015 12:08 PM    LYMPHOPCT 7 11/05/2013 10:01 AM    MONOPCT 11.8 09/24/2019 12:00 AM    MONOPCT 19 05/11/2015 12:08 PM    MONOPCT 6 11/05/2013 10:01 AM    EOSPCT 4.2 09/24/2019 12:00 AM     BMP:  Lab Results   Component Value Date/Time     09/24/2019 12:00 AM     05/11/2015 12:08 PM    K 4.1 09/24/2019 12:00 AM    K 3.9 05/11/2015 12:08 PM     09/24/2019 12:00 AM    CL 99 05/11/2015 12:08 PM    CO2 24 09/24/2019 12:00 AM    CO2 27 05/11/2015 12:08 PM    BUN 10 09/24/2019 12:00 AM    BUN 10 05/11/2015 12:08 PM    CREATININE 0.77 09/24/2019 12:00 AM    CREATININE 0.78 05/11/2015 12:08 PM     Hepatic Function Panel:   Lab Results   Component Value Date/Time    PROT 7.4 09/24/2019 12:00 AM    LABALBU 4.4 09/24/2019 12:00 AM    BILITOT 0.6 09/24/2019 12:00 AM    ALKPHOS 65 09/24/2019 12:00 AM    ALT 52 09/24/2019 12:00 AM    AST 28 09/24/2019 12:00 AM     No results found for: RPR  No results found for: HIV  No results found for: [facility]  Lab Results   Component Value Date/Time    MUCUS NOT REPORTED 11/06/2013 07:52 AM    RBC 5.22 09/24/2019 12:00 AM    TRICHOMONAS NOT REPORTED 11/06/2013 07:52 AM    WBC 5.3 09/24/2019 12:00 AM    YEAST NOT REPORTED 11/06/2013 07:52 AM    TURBIDITY CLOUDY 11/05/2013 09:28 AM     Lab Results   Component Value Date/Time    CREATININE 0.77 09/24/2019 12:00 AM    GLUCOSE 94 05/11/2015 12:08 PM   you for allowing us to participate in the care of this patient. Please call with questions. Renate Conway MD  - Office: [phone]    Please note that this chart was generated using voice recognition Dragon dictation software. Although every effort was made to ensure the accuracy of this automated transcription, some errors in transcription mayhave occurred.

## 2022-08-18 ENCOUNTER — TELEPHONE (OUTPATIENT)
Dept: INFECTIOUS DISEASES | Age: 32
End: 2022-08-18

## 2022-08-18 LAB
BASOPHILS ABSOLUTE: NORMAL
BASOPHILS RELATIVE PERCENT: NORMAL
EOSINOPHILS ABSOLUTE: NORMAL
EOSINOPHILS RELATIVE PERCENT: NORMAL
HCT VFR BLD CALC: NORMAL %
HEMOGLOBIN: NORMAL
HIV AG/AB: NORMAL
LYMPHOCYTES ABSOLUTE: NORMAL
LYMPHOCYTES RELATIVE PERCENT: NORMAL
MCH RBC QN AUTO: NORMAL PG
MCHC RBC AUTO-ENTMCNC: NORMAL G/DL
MCV RBC AUTO: NORMAL FL
MONOCYTES ABSOLUTE: NORMAL
MONOCYTES RELATIVE PERCENT: NORMAL
NEUTROPHILS ABSOLUTE: NORMAL
NEUTROPHILS RELATIVE PERCENT: NORMAL
PDW BLD-RTO: NORMAL %
PLATELET # BLD: NORMAL 10*3/UL
PMV BLD AUTO: NORMAL FL
RBC # BLD: NORMAL 10*6/UL
SEDIMENTATION RATE, ERYTHROCYTE: NORMAL
WBC # BLD: NORMAL 10*3/UL

## 2022-08-18 RX ORDER — VANCOMYCIN HYDROCHLORIDE 250 MG/1
250 CAPSULE ORAL 4 TIMES DAILY
Qty: 40 CAPSULE | Refills: 0 | Status: SHIPPED | OUTPATIENT
Start: 2022-08-18 | End: 2022-09-01

## 2022-08-18 NOTE — TELEPHONE ENCOUNTER
Les Ramírez from pharmacy called. Bactroban nasal ointment has been discontinued, Les Ramírez is requesting the rx be changed to bactroban ointment and pt can apply with a q tip. Please advise if you agree.  Thank you

## 2022-08-19 ENCOUNTER — TELEPHONE (OUTPATIENT)
Dept: INFECTIOUS DISEASES | Age: 32
End: 2022-08-19

## 2022-08-23 DIAGNOSIS — K59.1 FUNCTIONAL DIARRHEA: ICD-10-CM

## 2022-08-23 DIAGNOSIS — L02.92 BOILS OF MULTIPLE SITES: ICD-10-CM

## 2022-08-23 DIAGNOSIS — B95.8 STAPH INFECTION: Primary | ICD-10-CM

## 2022-08-23 DIAGNOSIS — R21 SKIN RASH: ICD-10-CM

## 2022-08-23 RX ORDER — MUPIROCIN CALCIUM 20 MG/G
CREAM TOPICAL
Qty: 1 EACH | Refills: 2 | Status: SHIPPED | OUTPATIENT
Start: 2022-08-23

## 2022-10-17 ENCOUNTER — OFFICE VISIT (OUTPATIENT)
Dept: INFECTIOUS DISEASES | Age: 32
End: 2022-10-17
Payer: COMMERCIAL

## 2022-10-17 VITALS
DIASTOLIC BLOOD PRESSURE: 101 MMHG | SYSTOLIC BLOOD PRESSURE: 149 MMHG | RESPIRATION RATE: 16 BRPM | HEART RATE: 85 BPM | OXYGEN SATURATION: 97 % | WEIGHT: 224 LBS | BODY MASS INDEX: 30.34 KG/M2 | HEIGHT: 72 IN

## 2022-10-17 DIAGNOSIS — H60.333 ACUTE SWIMMER'S EAR OF BOTH SIDES: ICD-10-CM

## 2022-10-17 DIAGNOSIS — Z87.2 HX OF FOLLICULITIS: Primary | ICD-10-CM

## 2022-10-17 PROCEDURE — 99214 OFFICE O/P EST MOD 30 MIN: CPT | Performed by: INTERNAL MEDICINE

## 2022-10-17 ASSESSMENT — ENCOUNTER SYMPTOMS
EYE PAIN: 0
ABDOMINAL DISTENTION: 0
APNEA: 0
COLOR CHANGE: 0
EYE REDNESS: 0
EYE DISCHARGE: 0

## 2022-10-17 NOTE — PROGRESS NOTES
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 Culture of the portal if it recurs  His boil seem to recur in the May to July season over the last 2 years, probably the sweat season. Hence personal decontamination might not show results until then  Nasal Bactroban twice a day for 2 weeks, has not used it this way yet, I reemphasized it. See me. Diagnosis Orders   1. Hx of folliculitis        2. Acute swimmer's ear of both sides            Return if symptoms worsen or fail to improve. 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   Swimmer s ear R > L recurrent and now present    Plan;  Test the stools for C dff  and stool panel - keep probiotic  Nasal swab for MRSA and SA  Decontaminate - disc w pt decontamination   monogamous sex w man, get HIV and VDRL  2 small wart like lesions on R arm, ask derm to eval for possible basal cell  Cipro otic x 2 weeks    . Boils of multiple sites  Culture, MRSA, Screening     Clostridium Difficile Toxin/Antigen     Gastrointestinal Panel, Molecular     Culture, Aerobic     HIV Screen     T. Pallidum Ab     Chlamydia/GC DNA, Urine     mupirocin (BACTROBAN NASAL) 2 % nasal ointment     CBC with Auto Differential     Sedimentation Rate       2. Functional diarrhea  HIV Screen     T. Pallidum Ab     Chlamydia/GC DNA, Urine     mupirocin (BACTROBAN NASAL) 2 % nasal ointment       3. Skin rash  External Referral To Dermatology     Chlamydia/GC DNA, Urine       4. Chronic swimmer's ear of right side  ciprofloxacin-hydrocortisone (CIPRO HC) 0.2-1 % otic suspension           Visit 10/17/22  STD neg  Diarrhea C diff neg  and E coli enterohemorr + but resolved spontan  MRSA nose neg  Post nasal creams and no more boils  Usually bois May July and then spring after came back  Lost wt 11 pds  Exercises and BP on gen down    Has seen a [facility] dermatologist who asked him to do bleach baths etc. we had a long discussion about decontamination again. Since he does not have those lesions until the wet season, he might not know the result of what he is doing at this time, until this past season kicks again. But I would really insist on regular decontamination of the house to keeping things clean. As well as specific personal hygiene. Otherwise if there is a knot on board, would suggest getting a culture of the boil before antibiotics if possible. His swimmers ear seem better today, there is barely any pink discoloration over the left ear but he does have some tenderness, right ear external canal is very clean.   And no wax in both    See me otherwise as needed        I have personally reviewed the past medical history, past surgical history, medications, social history, and family history, and I haveupdated the database accordingly. Past Medical History:     Past Medical History:   Diagnosis Date    Chronic sinusitis     Concussion     s/p falling off ladder    Kidney stone     Lemiere syndrome     POTS (postural orthostatic tachycardia syndrome)     Pulmonary emboli ([facility] [address]     States \"17\" PE's       Past Surgical  History:     Past Surgical History:   Procedure Laterality Date    LITHOTRIPSY      SINUS SURGERY      Dr. Makenna Ricketts       Medications:     Current Outpatient Medications:     mupirocin (BACTROBAN) 2 % cream, Apply 2 times daily. on a cu tip in each nostril x 2 weeks, Disp: 1 each, Rfl: 2    fluticasone-salmeterol (ADVAIR) 250-50 MCG/ACT AEPB diskus inhaler, Inhale 1 puff into the lungs every 12 hours, Disp: , Rfl:     azelastine (ASTELIN) 0.1 % nasal spray, 2 times daily, Disp: , Rfl:     fluticasone (FLONASE) 50 MCG/ACT nasal spray, 1 spray by Each Nostril route daily, Disp: , Rfl:     Loratadine (CLARITIN PO), Take by mouth daily, Disp: , Rfl:     montelukast (SINGULAIR) 10 MG tablet, Take 10 mg by mouth nightly, Disp: , Rfl:     mupirocin (BACTROBAN NASAL) 2 % nasal ointment, by Nasal route 2 times daily for 14 days Take by Nasal route 2 times daily. , Disp: 2 each, Rfl: 2    amLODIPine (NORVASC) 10 MG tablet, Take 1 tablet by mouth daily (Patient not taking: Reported on 10/17/2022), Disp: 30 tablet, Rfl: 2    EPINEPHrine (EPIPEN 2-LACI) 0.3 MG/0.3ML SOAJ injection, Inject 0.3 mLs into the skin once as needed (anaphylaxsis), Disp: 1 each, Rfl: 1      Social History:     Social History     Socioeconomic History    Marital status:      Spouse name: Not on file    Number of children: Not on file    Years of education: Not on file    Highest education level: Not on file   Occupational History    Not on file   Tobacco Use    Smoking status: Never    Smokeless tobacco: Never   Vaping Use    Vaping Use: Never used   Substance and Sexual Activity Alcohol use: Yes     Comment: Social    Drug use: Yes     Types: Marijuana [place])     Comment: occasional    Sexual activity: Yes     Partners: Male     Comment: , Closed relationship. Last 6 month number 5,  100 or so life no   Other Topics Concern    Not on file   Social History Narrative    Not on file     Social Determinants of Health     Financial Resource Strain: Not on file   Food Insecurity: Not on file   Transportation Needs: Not on file   Physical Activity: Not on file   Stress: Not on file   Social Connections: Not on file   Intimate Partner Violence: Not on file   Housing Stability: Not on file       Family History:     Family History   Problem Relation Age of Onset    Heart Disease Maternal Grandfather         triple bypass but a smoker    Other Sister         P.O. T.S    Asthma Brother     Asthma Sister         Allergies:   Latex, Bupropion, and Sulfa antibiotics     Review of Systems:   Review of Systems   Constitutional:  Negative for activity change. HENT:  Negative for congestion and ear pain. Eyes:  Negative for pain, discharge and redness. Respiratory:  Negative for apnea. Cardiovascular:  Negative for chest pain. Gastrointestinal:  Negative for abdominal distention. Endocrine: Negative for heat intolerance. Genitourinary:  Negative for dysuria. Musculoskeletal:  Negative for arthralgias. Skin:  Negative for color change. Allergic/Immunologic: Negative for immunocompromised state. Neurological:  Negative for dizziness, seizures and speech difficulty. Hematological:  Negative for adenopathy. Psychiatric/Behavioral:  Negative for agitation. Physical Examination :   Blood pressure (!) 149/101, pulse 85, resp. rate 16, height 6' (1.829 m), weight 224 lb (101.6 kg), SpO2 97 %. Physical Exam  Constitutional:       Appearance: Normal appearance. HENT:      Head: Normocephalic and atraumatic.       Ears:      Comments: R ear red canal. Swimmer s ear     Nose: Nose normal.      Mouth/Throat:      Mouth: Mucous membranes are moist.   Eyes:      General:         Left eye: No discharge. Conjunctiva/sclera: Conjunctivae normal.   Cardiovascular:      Rate and Rhythm: Normal rate and regular rhythm. Heart sounds: Normal heart sounds. No murmur heard. Pulmonary:      Effort: No respiratory distress. Breath sounds: Normal breath sounds. Abdominal:      General: There is no distension. Palpations: Abdomen is soft. Tenderness: There is no abdominal tenderness. Genitourinary:     Comments: No umaña  Musculoskeletal:         General: No swelling or deformity. Cervical back: Neck supple. No rigidity. Skin:     General: Skin is dry. Coloration: Skin is not jaundiced or pale. Findings: No erythema. Neurological:      General: No focal deficit present. Mental Status: He is alert. Mental status is at baseline. Cranial Nerves: No cranial nerve deficit. Psychiatric:         Mood and Affect: Mood normal.         Thought Content:  Thought content normal.         Medical Decision Making:   I have independently reviewed/ordered the following labs:    CBCwith Differential:   Lab Results   Component Value Date/Time    WBC 5.3 09/24/2019 12:00 AM    WBC 4.7 05/11/2015 12:08 PM    HGB 16.0 09/24/2019 12:00 AM    HGB 15.5 05/11/2015 12:08 PM    HCT 45.3 09/24/2019 12:00 AM    HCT 47.4 05/11/2015 12:08 PM     09/24/2019 12:00 AM     05/11/2015 12:08 PM    LYMPHOPCT 32.7 09/24/2019 12:00 AM    LYMPHOPCT 24 05/11/2015 12:08 PM    LYMPHOPCT 7 11/05/2013 10:01 AM    MONOPCT 11.8 09/24/2019 12:00 AM    MONOPCT 19 05/11/2015 12:08 PM    MONOPCT 6 11/05/2013 10:01 AM    EOSPCT 4.2 09/24/2019 12:00 AM     BMP:  Lab Results   Component Value Date/Time     09/24/2019 12:00 AM     05/11/2015 12:08 PM    K 4.1 09/24/2019 12:00 AM    K 3.9 05/11/2015 12:08 PM     09/24/2019 12:00 AM    CL 99 05/11/2015 12:08 PM    CO2 24 09/24/2019 12:00 AM    CO2 27 05/11/2015 12:08 PM    BUN 10 09/24/2019 12:00 AM    BUN 10 05/11/2015 12:08 PM    CREATININE 0.77 09/24/2019 12:00 AM    CREATININE 0.78 05/11/2015 12:08 PM     Hepatic Function Panel:   Lab Results   Component Value Date/Time    PROT 7.4 09/24/2019 12:00 AM    LABALBU 4.4 09/24/2019 12:00 AM    BILITOT 0.6 09/24/2019 12:00 AM    ALKPHOS 65 09/24/2019 12:00 AM    ALT 52 09/24/2019 12:00 AM    AST 28 09/24/2019 12:00 AM     No results found for: RPR  No results found for: HIV  No results found for: [facility]  Lab Results   Component Value Date/Time    MUCUS NOT REPORTED 11/06/2013 07:52 AM    RBC 5.22 09/24/2019 12:00 AM    TRICHOMONAS NOT REPORTED 11/06/2013 07:52 AM    WBC 5.3 09/24/2019 12:00 AM    YEAST NOT REPORTED 11/06/2013 07:52 AM    TURBIDITY CLOUDY 11/05/2013 09:28 AM     Lab Results   Component Value Date/Time    CREATININE 0.77 09/24/2019 12:00 AM    GLUCOSE 94 05/11/2015 12:08 PM   you for allowing us to participate in the care of this patient. Please call with questions. Marcie Connor MD  - Office: [phone]    Please note that this chart was generated using voice recognition Dragon dictation software. Although every effort was made to ensure the accuracy of this automated transcription, some errors in transcription mayhave occurred.